# Patient Record
Sex: FEMALE | Race: BLACK OR AFRICAN AMERICAN | NOT HISPANIC OR LATINO | Employment: FULL TIME | URBAN - METROPOLITAN AREA
[De-identification: names, ages, dates, MRNs, and addresses within clinical notes are randomized per-mention and may not be internally consistent; named-entity substitution may affect disease eponyms.]

---

## 2020-08-31 ENCOUNTER — HOSPITAL ENCOUNTER (EMERGENCY)
Facility: HOSPITAL | Age: 35
Discharge: HOME | End: 2020-08-31
Attending: EMERGENCY MEDICINE | Admitting: EMERGENCY MEDICINE
Payer: COMMERCIAL

## 2020-08-31 VITALS
WEIGHT: 290 LBS | TEMPERATURE: 99 F | SYSTOLIC BLOOD PRESSURE: 137 MMHG | RESPIRATION RATE: 20 BRPM | DIASTOLIC BLOOD PRESSURE: 87 MMHG | OXYGEN SATURATION: 98 % | HEART RATE: 86 BPM

## 2020-08-31 DIAGNOSIS — K21.9 ACID REFLUX: ICD-10-CM

## 2020-08-31 DIAGNOSIS — R00.2 PALPITATIONS: Primary | ICD-10-CM

## 2020-08-31 LAB
ALBUMIN SERPL BCP-MCNC: 3.7 G/DL (ref 3.5–5)
ALP SERPL-CCNC: 49 U/L (ref 46–116)
ALT SERPL W P-5'-P-CCNC: 32 U/L (ref 12–78)
ANION GAP SERPL CALCULATED.3IONS-SCNC: 8 MMOL/L (ref 4–13)
AST SERPL W P-5'-P-CCNC: 15 U/L (ref 5–45)
BASOPHILS # BLD AUTO: 0.06 THOUSANDS/ΜL (ref 0–0.1)
BASOPHILS NFR BLD AUTO: 1 % (ref 0–1)
BILIRUB SERPL-MCNC: 0.1 MG/DL (ref 0.2–1)
BUN SERPL-MCNC: 9 MG/DL (ref 5–25)
CALCIUM SERPL-MCNC: 9.2 MG/DL (ref 8.3–10.1)
CHLORIDE SERPL-SCNC: 103 MMOL/L (ref 100–108)
CO2 SERPL-SCNC: 27 MMOL/L (ref 21–32)
CREAT SERPL-MCNC: 1.01 MG/DL (ref 0.6–1.3)
EOSINOPHIL # BLD AUTO: 0.18 THOUSAND/ΜL (ref 0–0.61)
EOSINOPHIL NFR BLD AUTO: 2 % (ref 0–6)
ERYTHROCYTE [DISTWIDTH] IN BLOOD BY AUTOMATED COUNT: 16.1 % (ref 11.6–15.1)
GFR SERPL CREATININE-BSD FRML MDRD: 85 ML/MIN/1.73SQ M
GLUCOSE SERPL-MCNC: 128 MG/DL (ref 65–140)
HCT VFR BLD AUTO: 34.1 % (ref 34.8–46.1)
HGB BLD-MCNC: 10.5 G/DL (ref 11.5–15.4)
IMM GRANULOCYTES # BLD AUTO: 0.02 THOUSAND/UL (ref 0–0.2)
IMM GRANULOCYTES NFR BLD AUTO: 0 % (ref 0–2)
LYMPHOCYTES # BLD AUTO: 3.12 THOUSANDS/ΜL (ref 0.6–4.47)
LYMPHOCYTES NFR BLD AUTO: 38 % (ref 14–44)
MCH RBC QN AUTO: 24.4 PG (ref 26.8–34.3)
MCHC RBC AUTO-ENTMCNC: 30.8 G/DL (ref 31.4–37.4)
MCV RBC AUTO: 79 FL (ref 82–98)
MONOCYTES # BLD AUTO: 0.57 THOUSAND/ΜL (ref 0.17–1.22)
MONOCYTES NFR BLD AUTO: 7 % (ref 4–12)
NEUTROPHILS # BLD AUTO: 4.36 THOUSANDS/ΜL (ref 1.85–7.62)
NEUTS SEG NFR BLD AUTO: 52 % (ref 43–75)
NRBC BLD AUTO-RTO: 0 /100 WBCS
PLATELET # BLD AUTO: 471 THOUSANDS/UL (ref 149–390)
PMV BLD AUTO: 9.8 FL (ref 8.9–12.7)
POTASSIUM SERPL-SCNC: 3.6 MMOL/L (ref 3.5–5.3)
PROT SERPL-MCNC: 7.6 G/DL (ref 6.4–8.2)
RBC # BLD AUTO: 4.31 MILLION/UL (ref 3.81–5.12)
SODIUM SERPL-SCNC: 138 MMOL/L (ref 136–145)
TROPONIN I SERPL-MCNC: <0.02 NG/ML
TSH SERPL DL<=0.05 MIU/L-ACNC: 2.88 UIU/ML (ref 0.36–3.74)
WBC # BLD AUTO: 8.31 THOUSAND/UL (ref 4.31–10.16)

## 2020-08-31 PROCEDURE — 99285 EMERGENCY DEPT VISIT HI MDM: CPT

## 2020-08-31 PROCEDURE — 93005 ELECTROCARDIOGRAM TRACING: CPT

## 2020-08-31 PROCEDURE — 84484 ASSAY OF TROPONIN QUANT: CPT | Performed by: PHYSICIAN ASSISTANT

## 2020-08-31 PROCEDURE — 85025 COMPLETE CBC W/AUTO DIFF WBC: CPT | Performed by: PHYSICIAN ASSISTANT

## 2020-08-31 PROCEDURE — 84443 ASSAY THYROID STIM HORMONE: CPT | Performed by: PHYSICIAN ASSISTANT

## 2020-08-31 PROCEDURE — 36415 COLL VENOUS BLD VENIPUNCTURE: CPT | Performed by: PHYSICIAN ASSISTANT

## 2020-08-31 PROCEDURE — 99285 EMERGENCY DEPT VISIT HI MDM: CPT | Performed by: PHYSICIAN ASSISTANT

## 2020-08-31 PROCEDURE — 80053 COMPREHEN METABOLIC PANEL: CPT | Performed by: PHYSICIAN ASSISTANT

## 2020-08-31 RX ORDER — SUCRALFATE ORAL 1 G/10ML
1000 SUSPENSION ORAL ONCE
Status: COMPLETED | OUTPATIENT
Start: 2020-08-31 | End: 2020-08-31

## 2020-08-31 RX ORDER — LIDOCAINE HYDROCHLORIDE 20 MG/ML
15 SOLUTION OROPHARYNGEAL ONCE
Status: DISCONTINUED | OUTPATIENT
Start: 2020-08-31 | End: 2020-09-01 | Stop reason: HOSPADM

## 2020-08-31 RX ORDER — SUCRALFATE ORAL 1 G/10ML
1 SUSPENSION ORAL 4 TIMES DAILY
Qty: 420 ML | Refills: 0 | Status: SHIPPED | OUTPATIENT
Start: 2020-08-31 | End: 2020-09-24

## 2020-08-31 RX ADMIN — SUCRALFATE 1000 MG: 1 SUSPENSION ORAL at 21:33

## 2020-09-01 LAB
ATRIAL RATE: 94 BPM
P AXIS: 41 DEGREES
PR INTERVAL: 144 MS
QRS AXIS: 37 DEGREES
QRSD INTERVAL: 82 MS
QT INTERVAL: 344 MS
QTC INTERVAL: 430 MS
T WAVE AXIS: 12 DEGREES
VENTRICULAR RATE: 94 BPM

## 2020-09-01 PROCEDURE — 93010 ELECTROCARDIOGRAM REPORT: CPT | Performed by: INTERNAL MEDICINE

## 2020-09-01 NOTE — ED PROVIDER NOTES
History  Chief Complaint   Patient presents with    Palpitations     Pt states she has been having a fluttering feeling in her chest on and off since about 1:30 this morning      Patient is a 53-year-old female presents emergency department with complaints of heart palpitations and chest burning  Patient states symptoms began this morning and then resolved  States that the symptoms then returned this evening  States that she is not uncomfortable sensation in her chest that will cause burning up into her throat  She denies any nausea, vomiting, fever, chills, shortness of breath, cough, and congestion  She states that she not taking medication for this  None       Past Medical History:   Diagnosis Date    Disease of thyroid gland     Fibroids        Past Surgical History:   Procedure Laterality Date    THYROIDECTOMY         History reviewed  No pertinent family history  I have reviewed and agree with the history as documented  E-Cigarette/Vaping     E-Cigarette/Vaping Substances     Social History     Tobacco Use    Smoking status: Current Every Day Smoker     Packs/day: 0 25    Smokeless tobacco: Never Used   Substance Use Topics    Alcohol use: Yes     Comment: socially     Drug use: Never       Review of Systems   Constitutional: Negative for fever  Eyes: Negative for visual disturbance  Respiratory: Negative for shortness of breath  Cardiovascular: Positive for palpitations  Negative for chest pain  Gastrointestinal: Negative for diarrhea, nausea and vomiting  Reflux   All other systems reviewed and are negative  Physical Exam  Physical Exam  Vitals signs and nursing note reviewed  Constitutional:       Appearance: Normal appearance  HENT:      Head: Normocephalic and atraumatic        Right Ear: Tympanic membrane, ear canal and external ear normal       Left Ear: Tympanic membrane, ear canal and external ear normal       Nose: Nose normal       Mouth/Throat: Mouth: Mucous membranes are moist       Pharynx: Oropharynx is clear  Eyes:      Extraocular Movements: Extraocular movements intact  Conjunctiva/sclera: Conjunctivae normal       Pupils: Pupils are equal, round, and reactive to light  Neck:      Musculoskeletal: Normal range of motion  Cardiovascular:      Rate and Rhythm: Normal rate and regular rhythm  Pulses: Normal pulses  Heart sounds: Normal heart sounds  Pulmonary:      Effort: Pulmonary effort is normal       Breath sounds: Normal breath sounds  Abdominal:      General: Abdomen is flat  There is no distension  Palpations: Abdomen is soft  Musculoskeletal: Normal range of motion  Skin:     General: Skin is warm  Capillary Refill: Capillary refill takes less than 2 seconds  Neurological:      General: No focal deficit present  Mental Status: She is alert and oriented to person, place, and time  Psychiatric:         Mood and Affect: Mood normal          Behavior: Behavior normal          Thought Content:  Thought content normal          Judgment: Judgment normal          Vital Signs  ED Triage Vitals [08/31/20 1910]   Temperature Pulse Respirations Blood Pressure SpO2   99 °F (37 2 °C) 91 22 122/68 100 %      Temp Source Heart Rate Source Patient Position - Orthostatic VS BP Location FiO2 (%)   Oral Monitor Sitting Left arm --      Pain Score       --           Vitals:    08/31/20 1910 08/31/20 2030 08/31/20 2039   BP: 122/68 137/87    Pulse: 91 96 86   Patient Position - Orthostatic VS: Sitting Lying          Visual Acuity      ED Medications  Medications   sucralfate (CARAFATE) oral suspension 1,000 mg (1,000 mg Oral Given 8/31/20 2133)       Diagnostic Studies  Results Reviewed     Procedure Component Value Units Date/Time    TSH [[id number]]  (Normal) Collected:  08/31/20 2014    Lab Status:  Final result Specimen:  Blood from Arm, Right Updated:  08/31/20 2045     TSH 3RD [facility] 2 876 uIU/mL     Narrative: Patients undergoing fluorescein dye angiography may retain small amounts of fluorescein in the body for 48-72 hours post procedure  Samples containing fluorescein can produce falsely depressed TSH values  If the patient had this procedure,a specimen should be resubmitted post fluorescein clearance        Troponin I [[id number]]  (Normal) Collected:  08/31/20 2014    Lab Status:  Final result Specimen:  Blood from Arm, Right Updated:  08/31/20 2038     Troponin I <0 02 ng/mL     Comprehensive metabolic panel [[id number]]  (Abnormal) Collected:  08/31/20 2014    Lab Status:  Final result Specimen:  Blood from Arm, Right Updated:  08/31/20 2036     Sodium 138 mmol/L      Potassium 3 6 mmol/L      Chloride 103 mmol/L      CO2 27 mmol/L      ANION GAP 8 mmol/L      BUN 9 mg/dL      Creatinine 1 01 mg/dL      Glucose 128 mg/dL      Calcium 9 2 mg/dL      AST 15 U/L      ALT 32 U/L      Alkaline Phosphatase 49 U/L      Total Protein 7 6 g/dL      Albumin 3 7 g/dL      Total Bilirubin 0 10 mg/dL      eGFR 85 ml/min/1 73sq m     Narrative:       [place] guidelines for Chronic Kidney Disease (CKD):     Stage 1 with normal or high GFR (GFR > 90 mL/min/1 73 square meters)    Stage 2 Mild CKD (GFR = 60-89 mL/min/1 73 square meters)    Stage 3A Moderate CKD (GFR = 45-59 mL/min/1 73 square meters)    Stage 3B Moderate CKD (GFR = 30-44 mL/min/1 73 square meters)    Stage 4 Severe CKD (GFR = 15-29 mL/min/1 73 square meters)    Stage 5 End Stage CKD (GFR <15 mL/min/1 73 square meters)  Note: GFR calculation is accurate only with a steady state creatinine    CBC and differential [[id number]]  (Abnormal) Collected:  08/31/20 2014    Lab Status:  Final result Specimen:  Blood from Arm, Right Updated:  08/31/20 2020     WBC 8 31 Thousand/uL      RBC 4 31 Million/uL      Hemoglobin 10 5 g/dL      Hematocrit 34 1 %      MCV 79 fL      MCH 24 4 pg      MCHC 30 8 g/dL      RDW 16 1 %      MPV 9 8 fL Platelets 953 Thousands/uL      nRBC 0 /100 WBCs      Neutrophils Relative 52 %      Immat GRANS % 0 %      Lymphocytes Relative 38 %      Monocytes Relative 7 %      Eosinophils Relative 2 %      Basophils Relative 1 %      Neutrophils Absolute 4 36 Thousands/µL      Immature Grans Absolute 0 02 Thousand/uL      Lymphocytes Absolute 3 12 Thousands/µL      Monocytes Absolute 0 57 Thousand/µL      Eosinophils Absolute 0 18 Thousand/µL      Basophils Absolute 0 06 Thousands/µL                  No orders to display              Procedures  ECG 12 Lead Documentation Only    Date/Time: 9/1/2020 3:59 AM  Performed by: Flori John PA-C  Authorized by: Flori John PA-C     Indications / Diagnosis:  Palpitations  ECG reviewed by me, the ED Provider: yes    Patient location:  ED  Previous ECG:     Previous ECG:  Unavailable  Interpretation:     Interpretation: normal    Rate:     ECG rate:  84    ECG rate assessment: normal    Rhythm:     Rhythm: sinus rhythm               ED Course       US AUDIT      Most Recent Value   Initial Alcohol Screen: US AUDIT-C    1  How often do you have a drink containing alcohol?  0 Filed at: 08/31/2020 2018   2  How many drinks containing alcohol do you have on a typical day you are drinking? 0 Filed at: 08/31/2020 2018   3a  Male UNDER 65: How often do you have five or more drinks on one occasion? 0 Filed at: 08/31/2020 2018   3b  FEMALE Any Age, or MALE 65+: How often do you have 4 or more drinks on one occassion? 0 Filed at: 08/31/2020 2018   Audit-C Score  0 Filed at: 08/31/2020 2018                  MARCOS/DAST-10      Most Recent Value   How many times in the past year have you    Used an illegal drug or used a prescription medication for non-medical reasons?   Never Filed at: 08/31/2020 2018                                MDM  Number of Diagnoses or Management Options  Acid reflux:   Palpitations:   Diagnosis management comments: Patient is a 59-year-old female presents emergency department with complaints of heart palpitations and chest burning  Patient states symptoms began this morning and then resolved  States that the symptoms then returned this evening  States that she is not uncomfortable sensation in her chest that will cause burning up into her throat  She denies any nausea, vomiting, fever, chills, shortness of breath, cough, and congestion  She states that she not taking medication for this  On examination, exam is unremarkable  Lab evaluations performed and is unremarkable  There is no evidence of ACS  Patient was given Carafate solution had improvement of symptoms  Discussed with patient her symptoms more consistent with reflux  Prescription for Carafate was given  She is to follow up with GI  Return parameters were discussed  Patient stable for discharge  Amount and/or Complexity of Data Reviewed  Clinical lab tests: ordered and reviewed  Tests in the radiology section of CPT®: ordered and reviewed  Independent visualization of images, tracings, or specimens: yes    Risk of Complications, Morbidity, and/or Mortality  Presenting problems: moderate  Diagnostic procedures: low  Management options: moderate    Patient Progress  Patient progress: stable        Disposition  Final diagnoses:   Palpitations   Acid reflux     Time reflects when diagnosis was documented in both MDM as applicable and the Disposition within this note     Time User Action Codes Description Comment    8/31/2020  9:54 PM Tia Block Add [R00 2] Palpitations     8/31/2020  9:54 PM Tia Block Add [K21 9] Acid reflux       ED Disposition     ED Disposition Condition Date/Time Comment    Discharge Good Mon Aug 31, 2020 2154 Amrit Galan discharge to home/self care              Follow-up Information     Follow up With Specialties Details Why Contact Info Additional Information    MILTON Lundberg Nurse Practitioner, Family Medicine Schedule an appointment as soon as possible for a visit   [address] [place] [address] [facility]   [facility] [place] [id number] [address] [facility] [place], [address], [facility] [place], [place], [address] [facility]          Discharge Medication List as of 8/31/2020  9:55 PM      START taking these medications    Details   sucralfate (CARAFATE) 1 g/10 mL suspension Take 10 mL (1 g total) by mouth 4 (four) times a day, Starting Mon 8/31/2020, Normal           No discharge procedures on file      PDMP Review     None          ED Provider  Electronically Signed by           Yane Patel PA-C  09/01/20 9314

## 2020-09-16 RX ORDER — LEVOTHYROXINE SODIUM 0.05 MG/1
TABLET ORAL
COMMUNITY
Start: 2020-08-26

## 2020-09-16 RX ORDER — NYSTATIN 100000 U/G
CREAM TOPICAL 2 TIMES DAILY
COMMUNITY
Start: 2020-05-29 | End: 2021-05-06 | Stop reason: ALTCHOICE

## 2020-09-16 RX ORDER — TRIAMCINOLONE ACETONIDE 1 MG/G
CREAM TOPICAL 2 TIMES DAILY
COMMUNITY
Start: 2020-07-07 | End: 2021-05-06 | Stop reason: ALTCHOICE

## 2020-09-16 RX ORDER — ERGOCALCIFEROL 1.25 MG/1
50000 CAPSULE ORAL
COMMUNITY
Start: 2020-09-01 | End: 2020-11-24

## 2020-09-16 RX ORDER — FERROUS SULFATE 325(65) MG
325 TABLET ORAL
COMMUNITY
Start: 2020-09-01 | End: 2021-07-12 | Stop reason: SDUPTHER

## 2020-09-17 ENCOUNTER — OFFICE VISIT (OUTPATIENT)
Dept: GASTROENTEROLOGY | Facility: CLINIC | Age: 35
End: 2020-09-17
Payer: COMMERCIAL

## 2020-09-17 VITALS
SYSTOLIC BLOOD PRESSURE: 102 MMHG | BODY MASS INDEX: 47.6 KG/M2 | DIASTOLIC BLOOD PRESSURE: 68 MMHG | TEMPERATURE: 98.3 F | HEIGHT: 64 IN | WEIGHT: 278.8 LBS | HEART RATE: 88 BPM

## 2020-09-17 DIAGNOSIS — K21.9 GASTROESOPHAGEAL REFLUX DISEASE, ESOPHAGITIS PRESENCE NOT SPECIFIED: Primary | ICD-10-CM

## 2020-09-17 DIAGNOSIS — K76.9 LIVER LESION: ICD-10-CM

## 2020-09-17 PROCEDURE — 99203 OFFICE O/P NEW LOW 30 MIN: CPT | Performed by: PHYSICIAN ASSISTANT

## 2020-09-17 RX ORDER — PANTOPRAZOLE SODIUM 40 MG/1
40 TABLET, DELAYED RELEASE ORAL DAILY
Qty: 60 TABLET | Refills: 3 | Status: SHIPPED | OUTPATIENT
Start: 2020-09-17 | End: 2021-05-06 | Stop reason: ALTCHOICE

## 2020-09-17 NOTE — PROGRESS NOTES
[facility] - Outpatient Consultation  Mao Doe 37 y o  female MRN: [id number]  Encounter: [id number]          ASSESSMENT AND PLAN:      1  Gastroesophageal reflux disease, esophagitis presence not specified  Start Pantoprazole 40mg BID  Schedule EGD    2  Liver lesion  Abn US showing 2 small lesions - MRI recommended which she could not tolerate - will plan CT     ______________________________________________________________________    HPI:  81-year-old female presents for evaluation of heartburn, regurgitation, dysphagia, chest pain and abnormal ultrasound  She has been having heartburn issues for many years but reports that recently she has been having increasing symptoms  She states that the symptoms were so severe in August 31st that she went to the emergency room  Workup was relatively unremarkable  She was discharged with a prescription for sucralfate which he has been taking 4 times a day  She admits that her symptoms had improved but are still present  She has drastically changed where she is not eating most acidic foods and has cut back on fried fatty foods  Prior to being evaluated in the emergency room she had been taking large quantities of ibuprofen due to symptomatic fibroids  Since the emergency room she has stopped this  She denies any excessive alcohol use  She has never seen a gastroenterologist or had an endoscopy for her heartburn in the past   She denies any hematemesis or melena  She had an ultrasound of the abdomen in July  This showed 2 hyperechoic lesions measuring 2 9 in 2 7 cm in the right and left hepatic lobes  MRI was recommended which she was scheduled  Unfortunately, she could not tolerate the test she became very claustrophobic  She has never been on estrogen containing oral contraceptive pills  REVIEW OF SYSTEMS:    CONSTITUTIONAL: Denies any fever, chills, rigors, and weight loss  HEENT: No earache or tinnitus   Denies hearing loss or visual disturbances  CARDIOVASCULAR: No chest pain or palpitations  RESPIRATORY: Denies any cough, hemoptysis, shortness of breath or dyspnea on exertion  GASTROINTESTINAL: As noted in the History of Present Illness  GENITOURINARY: No problems with urination  Denies any hematuria or dysuria  NEUROLOGIC: No dizziness or vertigo, denies headaches  MUSCULOSKELETAL: Denies any muscle or joint pain  SKIN: Denies skin rashes or itching  ENDOCRINE: Denies excessive thirst  Denies intolerance to heat or cold  PSYCHOSOCIAL: Denies depression or anxiety  Denies any recent memory loss  Historical Information   Past Medical History:   Diagnosis Date    Disease of thyroid gland     Fibroids      Past Surgical History:   Procedure Laterality Date    THYROIDECTOMY       Social History   Social History     Substance and Sexual Activity   Alcohol Use Yes    Comment: socially      Social History     Substance and Sexual Activity   Drug Use Never     Social History     Tobacco Use   Smoking Status Former Smoker    Packs/day: 0 25   Smokeless Tobacco Never Used   Tobacco Comment    just quit      History reviewed  No pertinent family history  Meds/Allergies       Current Outpatient Medications:     ergocalciferol (VITAMIN D2) 50,000 units    ferrous sulfate 325 (65 Fe) mg tablet    levothyroxine 50 mcg tablet    nystatin (MYCOSTATIN) cream    sucralfate (CARAFATE) 1 g/10 mL suspension    triamcinolone (KENALOG) 0 1 % cream    pantoprazole (PROTONIX) 40 mg tablet    No Known Allergies        Objective     Blood pressure 102/68, pulse 88, temperature 98 3 °F (36 8 °C), height 5' 4" (1 626 m), weight 126 kg (278 lb 12 8 oz)  Body mass index is 47 86 kg/m²          PHYSICAL EXAM:      General Appearance:   Alert, cooperative, no distress   HEENT:   Normocephalic, atraumatic, anicteric      Neck:  Supple, symmetrical, trachea midline   Lungs:   Clear to auscultation bilaterally; no rales, rhonchi or wheezing; respirations unlabored    Heart[de-identified]   Regular rate and rhythm; no murmur, rub, or gallop  Abdomen:   Soft, non-tender, non-distended; normal bowel sounds; no masses, no organomegaly    Genitalia:   Deferred    Rectal:   Deferred    Extremities:  No cyanosis, clubbing or edema    Pulses:  2+ and symmetric    Skin:  No jaundice, rashes, or lesions    Lymph nodes:  No palpable cervical lymphadenopathy        Lab Results:   No visits with results within 1 Day(s) from this visit     Latest known visit with results is:   Admission on 08/31/2020, Discharged on 08/31/2020   Component Date Value    WBC 08/31/2020 8 31     RBC 08/31/2020 4 31     Hemoglobin 08/31/2020 10 5*    Hematocrit 08/31/2020 34 1*    MCV 08/31/2020 79*    MCH 08/31/2020 24 4*    MCHC 08/31/2020 30 8*    RDW 08/31/2020 16 1*    MPV 08/31/2020 9 8     Platelets 77/66/3525 471*    nRBC 08/31/2020 0     Neutrophils Relative 08/31/2020 52     Immat GRANS % 08/31/2020 0     Lymphocytes Relative 08/31/2020 38     Monocytes Relative 08/31/2020 7     Eosinophils Relative 08/31/2020 2     Basophils Relative 08/31/2020 1     Neutrophils Absolute 08/31/2020 4 36     Immature Grans Absolute 08/31/2020 0 02     Lymphocytes Absolute 08/31/2020 3 12     Monocytes Absolute 08/31/2020 0 57     Eosinophils Absolute 08/31/2020 0 18     Basophils Absolute 08/31/2020 0 06     Sodium 08/31/2020 138     Potassium 08/31/2020 3 6     Chloride 08/31/2020 103     CO2 08/31/2020 27     ANION GAP 08/31/2020 8     BUN 08/31/2020 9     Creatinine 08/31/2020 1 01     Glucose 08/31/2020 128     Calcium 08/31/2020 9 2     AST 08/31/2020 15     ALT 08/31/2020 32     Alkaline Phosphatase 08/31/2020 49     Total Protein 08/31/2020 7 6     Albumin 08/31/2020 3 7     Total Bilirubin 08/31/2020 0 10*    eGFR 08/31/2020 85     Troponin I 08/31/2020 <0 02     TSH 3RD GENERATON 08/31/2020 2 876     Ventricular Rate 08/31/2020 94     Atrial Rate 08/31/2020 94     AK Interval 08/31/2020 144     QRSD Interval 08/31/2020 82     QT Interval 08/31/2020 344     QTC Interval 08/31/2020 430     P Axis 08/31/2020 41     QRS Axis 08/31/2020 37     T Wave [place] 08/31/2020 12          Radiology Results:   No results found

## 2020-09-24 ENCOUNTER — ANESTHESIA (OUTPATIENT)
Dept: GASTROENTEROLOGY | Facility: HOSPITAL | Age: 35
End: 2020-09-24

## 2020-09-24 ENCOUNTER — HOSPITAL ENCOUNTER (OUTPATIENT)
Dept: GASTROENTEROLOGY | Facility: HOSPITAL | Age: 35
Setting detail: OUTPATIENT SURGERY
Discharge: HOME | End: 2020-09-24
Attending: INTERNAL MEDICINE | Admitting: INTERNAL MEDICINE
Payer: COMMERCIAL

## 2020-09-24 ENCOUNTER — ANESTHESIA EVENT (OUTPATIENT)
Dept: GASTROENTEROLOGY | Facility: HOSPITAL | Age: 35
End: 2020-09-24

## 2020-09-24 VITALS
WEIGHT: 276.9 LBS | HEIGHT: 64 IN | SYSTOLIC BLOOD PRESSURE: 127 MMHG | RESPIRATION RATE: 17 BRPM | DIASTOLIC BLOOD PRESSURE: 72 MMHG | OXYGEN SATURATION: 99 % | HEART RATE: 84 BPM | BODY MASS INDEX: 47.27 KG/M2 | TEMPERATURE: 97.7 F

## 2020-09-24 VITALS — HEART RATE: 101 BPM

## 2020-09-24 DIAGNOSIS — K21.9 GASTROESOPHAGEAL REFLUX DISEASE, ESOPHAGITIS PRESENCE NOT SPECIFIED: ICD-10-CM

## 2020-09-24 PROBLEM — E03.9 HYPOTHYROIDISM: Status: ACTIVE | Noted: 2020-09-24

## 2020-09-24 LAB
EXT PREGNANCY TEST URINE: NEGATIVE
EXT. CONTROL: NORMAL

## 2020-09-24 PROCEDURE — 88342 IMHCHEM/IMCYTCHM 1ST ANTB: CPT | Performed by: PATHOLOGY

## 2020-09-24 PROCEDURE — 88341 IMHCHEM/IMCYTCHM EA ADD ANTB: CPT | Performed by: PATHOLOGY

## 2020-09-24 PROCEDURE — 43239 EGD BIOPSY SINGLE/MULTIPLE: CPT | Performed by: INTERNAL MEDICINE

## 2020-09-24 PROCEDURE — 88305 TISSUE EXAM BY PATHOLOGIST: CPT | Performed by: PATHOLOGY

## 2020-09-24 PROCEDURE — 81025 URINE PREGNANCY TEST: CPT | Performed by: STUDENT IN AN ORGANIZED HEALTH CARE EDUCATION/TRAINING PROGRAM

## 2020-09-24 RX ORDER — PROPOFOL 10 MG/ML
INJECTION, EMULSION INTRAVENOUS AS NEEDED
Status: DISCONTINUED | OUTPATIENT
Start: 2020-09-24 | End: 2020-09-24

## 2020-09-24 RX ORDER — SODIUM CHLORIDE, SODIUM LACTATE, POTASSIUM CHLORIDE, CALCIUM CHLORIDE 600; 310; 30; 20 MG/100ML; MG/100ML; MG/100ML; MG/100ML
100 INJECTION, SOLUTION INTRAVENOUS CONTINUOUS
Status: DISCONTINUED | OUTPATIENT
Start: 2020-09-24 | End: 2020-09-28 | Stop reason: HOSPADM

## 2020-09-24 RX ORDER — LIDOCAINE HYDROCHLORIDE 10 MG/ML
INJECTION, SOLUTION EPIDURAL; INFILTRATION; INTRACAUDAL; PERINEURAL AS NEEDED
Status: DISCONTINUED | OUTPATIENT
Start: 2020-09-24 | End: 2020-09-24

## 2020-09-24 RX ADMIN — PROPOFOL 50 MG: 10 INJECTION, EMULSION INTRAVENOUS at 11:43

## 2020-09-24 RX ADMIN — SODIUM CHLORIDE, SODIUM LACTATE, POTASSIUM CHLORIDE, AND CALCIUM CHLORIDE 100 ML/HR: .6; .31; .03; .02 INJECTION, SOLUTION INTRAVENOUS at 10:47

## 2020-09-24 RX ADMIN — LIDOCAINE HYDROCHLORIDE 50 MG: 10 INJECTION, SOLUTION EPIDURAL; INFILTRATION; INTRACAUDAL; PERINEURAL at 11:39

## 2020-09-24 RX ADMIN — PROPOFOL 100 MG: 10 INJECTION, EMULSION INTRAVENOUS at 11:39

## 2020-09-24 RX ADMIN — PROPOFOL 50 MG: 10 INJECTION, EMULSION INTRAVENOUS at 11:41

## 2020-09-24 NOTE — H&P
History and Physical - [facility]  Brian Camarillo 37 y o  female MRN: [id number]      HPI: Brian Camarillo is a 37y o  year old female who presents for evaluation of gastroesophageal reflux disease      REVIEW OF SYSTEMS: Per the HPI, and otherwise unremarkable  Historical Information   Past Medical History:   Diagnosis Date    Anemia     Disease of thyroid gland     Fibroids      Past Surgical History:   Procedure Laterality Date    THYROIDECTOMY       Social History   Social History     Substance and Sexual Activity   Alcohol Use Not Currently    Comment: socially      Social History     Substance and Sexual Activity   Drug Use Never     Social History     Tobacco Use   Smoking Status Former Smoker    Packs/day: 0 25   Smokeless Tobacco Never Used   Tobacco Comment    just quit      History reviewed  No pertinent family history  Meds/Allergies     (Not in a hospital admission)      No Known Allergies    Objective     Blood pressure 137/72, pulse 89, temperature 97 5 °F (36 4 °C), temperature source Temporal, resp  rate 20, height 5' 4" (1 626 m), weight 126 kg (276 lb 14 4 oz), last menstrual period 09/16/2020, SpO2 100 %  PHYSICAL EXAM    Gen: NAD  CV: RRR  CHEST: Clear  ABD: soft, NT/ND  EXT: no edema      ASSESSMENT/PLAN:  This is a 37y o  year old female here for esophagogastroduodenoscopy, and she is stable and optimized for her procedure

## 2020-09-24 NOTE — DISCHARGE INSTRUCTIONS
Upper Endoscopy   WHAT YOU NEED TO KNOW:   An upper endoscopy is also called an upper gastrointestinal (GI) endoscopy, or an esophagogastroduodenoscopy (EGD)  You may feel bloated, gassy, or have some abdominal discomfort after your procedure  Your throat may be sore for 24 to 36 hours  You may burp or pass gas from air that is still inside your body  DISCHARGE INSTRUCTIONS:   Call 911 for any of the following:   · You have sudden chest pain or trouble breathing  Seek care immediately if:   · You feel dizzy or faint  · You have trouble swallowing  · Your bowel movements are very dark or black  · Your abdomen is hard and firm and you have severe pain  · You vomit blood  Contact your healthcare provider if:   · You feel full or bloated and cannot burp or pass gas  · You have not had a bowel movement for 3 days after your procedure  · You have neck pain  · You have a fever or chills  · You have nausea or are vomiting  · You have a rash or hives  · You have questions or concerns about your endoscopy  Relieve a sore throat:  Suck on throat lozenges or crushed ice  Gargle with a small amount of warm salt water  Mix 1 teaspoon of salt and 1 cup of warm water to make salt water  Relieve gas and discomfort from bloating:  Lie on your right side with a heating pad on your abdomen  Take short walks to help pass gas  Eat small meals until bloating is relieved  Rest after your procedure: You have been given medicine to relax you  Do not  drive or make important decisions until the day after your procedure  Return to your normal activity as directed  You can usually return to work the day after your procedure  Follow up with your healthcare provider as directed:  Write down your questions so you remember to ask them during your visits     © 2017 [address] is for End User's use only and may not be sold, redistributed or otherwise used for commercial purposes  All illustrations and images included in CareNotes® are the copyrighted property of A D A M , Inc  or Kishor Cobb  The above information is an  only  It is not intended as medical advice for individual conditions or treatments  Talk to your doctor, nurse or pharmacist before following any medical regimen to see if it is safe and effective for you

## 2020-09-24 NOTE — ANESTHESIA PREPROCEDURE EVALUATION
Procedure:  EGD      Relevant Problems   ANESTHESIA (within normal limits)      ENDO   (+) Hypothyroidism      GI/HEPATIC   (+) Acid reflux        Physical Exam    Airway    Mallampati score: II  TM Distance: >3 FB  Neck ROM: full     Dental   No notable dental hx     Cardiovascular      Pulmonary      Other Findings        Anesthesia Plan  ASA Score- 2     Anesthesia Type- IV sedation with anesthesia with ASA Monitors  Additional Monitors:   Airway Plan:     Comment: Per patient, appropriately NPO, denies active CP/SOB/wheezing/symptoms related to heartburn/nausea/vomiting  Plan Factors-Exercise tolerance (METS): >4 METS  Chart reviewed  Patient summary reviewed  Patient is not a current smoker  Induction- intravenous  Postoperative Plan-     Informed Consent- Anesthetic plan and risks discussed with patient  I personally reviewed this patient with the CRNA  Discussed and agreed on the Anesthesia Plan with the CRNA  Edwin Camilo

## 2020-09-24 NOTE — ANESTHESIA POSTPROCEDURE EVALUATION
Post-Op Assessment Note    CV Status:  Stable    Pain management: adequate     Mental Status:  Alert and awake   Hydration Status:  Euvolemic   PONV Controlled:  Controlled   Airway Patency:  Patent      Post Op Vitals Reviewed: Yes      Staff: CRNA         No complications documented      BP   130/72   Temp      Pulse  95   Resp 18   SpO2   95

## 2020-09-30 ENCOUNTER — TREATMENT (OUTPATIENT)
Dept: GASTROENTEROLOGY | Facility: CLINIC | Age: 35
End: 2020-09-30

## 2020-09-30 ENCOUNTER — TELEPHONE (OUTPATIENT)
Dept: GASTROENTEROLOGY | Facility: CLINIC | Age: 35
End: 2020-09-30

## 2020-09-30 DIAGNOSIS — B96.81 HELICOBACTER POSITIVE GASTRITIS: Primary | ICD-10-CM

## 2020-09-30 DIAGNOSIS — K29.70 HELICOBACTER POSITIVE GASTRITIS: Primary | ICD-10-CM

## 2020-09-30 RX ORDER — CLARITHROMYCIN 500 MG/1
500 TABLET, COATED ORAL EVERY 12 HOURS SCHEDULED
Qty: 28 TABLET | Refills: 0 | Status: SHIPPED | OUTPATIENT
Start: 2020-09-30 | End: 2020-10-14

## 2020-09-30 RX ORDER — PANTOPRAZOLE SODIUM 40 MG/1
40 TABLET, DELAYED RELEASE ORAL 2 TIMES DAILY
Qty: 28 TABLET | Refills: 0 | Status: SHIPPED | OUTPATIENT
Start: 2020-09-30 | End: 2020-11-17

## 2020-09-30 RX ORDER — AMOXICILLIN 500 MG/1
1000 CAPSULE ORAL EVERY 12 HOURS SCHEDULED
Qty: 56 CAPSULE | Refills: 0 | Status: SHIPPED | OUTPATIENT
Start: 2020-09-30 | End: 2020-10-14

## 2020-09-30 NOTE — TELEPHONE ENCOUNTER
Called and advised pt      Routing to clerical for appt in [facility] December  Thank you

## 2020-09-30 NOTE — TELEPHONE ENCOUNTER
----- Message from Brandon Patel DO sent at 9/30/2020  7:27 AM EDT -----  Please call the patient with these results  The biopsies of the stomach showed some Helicobacter pylori organisms  It also showed evidence of mild gastritis  Will need to treat this patient with triple drug therapy  The patient has no known allergies  I will be using clarithromycin 500 mg twice daily plus amoxicillin 1000 mg twice daily plus pantoprazole 40 mg twice daily  All 3 medications need to be taken for 2 weeks  I will call these medications into the pharmacy  The patient should be seen for follow-up 8 weeks after treatment  Please provide the patient with a follow-up with 1 my physician assistants in mid December

## 2020-10-16 ENCOUNTER — HOSPITAL ENCOUNTER (OUTPATIENT)
Dept: CT IMAGING | Facility: HOSPITAL | Age: 35
Discharge: HOME | End: 2020-10-16
Payer: COMMERCIAL

## 2020-10-16 DIAGNOSIS — K76.9 LIVER LESION: ICD-10-CM

## 2020-10-16 PROCEDURE — 74160 CT ABDOMEN W/CONTRAST: CPT

## 2020-10-16 PROCEDURE — G1004 CDSM NDSC: HCPCS

## 2020-10-16 RX ADMIN — IOHEXOL 100 ML: 350 INJECTION, SOLUTION INTRAVENOUS at 10:44

## 2020-10-21 ENCOUNTER — TELEPHONE (OUTPATIENT)
Dept: GASTROENTEROLOGY | Facility: CLINIC | Age: 35
End: 2020-10-21

## 2020-11-17 DIAGNOSIS — K29.70 HELICOBACTER POSITIVE GASTRITIS: ICD-10-CM

## 2020-11-17 DIAGNOSIS — B96.81 HELICOBACTER POSITIVE GASTRITIS: ICD-10-CM

## 2020-11-17 RX ORDER — PANTOPRAZOLE SODIUM 40 MG/1
40 TABLET, DELAYED RELEASE ORAL 2 TIMES DAILY
Qty: 28 TABLET | Refills: 0 | Status: SHIPPED | OUTPATIENT
Start: 2020-11-17 | End: 2020-12-01

## 2020-12-10 RX ORDER — OXYCODONE HYDROCHLORIDE AND ACETAMINOPHEN 5; 325 MG/1; MG/1
TABLET ORAL
COMMUNITY
Start: 2020-10-26

## 2020-12-10 RX ORDER — IBUPROFEN 400 MG/1
TABLET ORAL
COMMUNITY
Start: 2020-10-26 | End: 2021-06-01

## 2020-12-10 RX ORDER — NORETHINDRONE ACETATE AND ETHINYL ESTRADIOL AND FERROUS FUMARATE 1MG-20(21)
KIT ORAL
COMMUNITY
Start: 2020-11-10 | End: 2021-11-25

## 2020-12-15 ENCOUNTER — OFFICE VISIT (OUTPATIENT)
Dept: GASTROENTEROLOGY | Facility: CLINIC | Age: 35
End: 2020-12-15
Payer: COMMERCIAL

## 2020-12-15 VITALS
SYSTOLIC BLOOD PRESSURE: 126 MMHG | DIASTOLIC BLOOD PRESSURE: 74 MMHG | WEIGHT: 270.8 LBS | HEART RATE: 85 BPM | BODY MASS INDEX: 46.23 KG/M2 | HEIGHT: 64 IN

## 2020-12-15 DIAGNOSIS — B96.81 HELICOBACTER POSITIVE GASTRITIS: ICD-10-CM

## 2020-12-15 DIAGNOSIS — K29.70 HELICOBACTER POSITIVE GASTRITIS: ICD-10-CM

## 2020-12-15 DIAGNOSIS — K76.9 LIVER LESION: Primary | ICD-10-CM

## 2020-12-15 PROCEDURE — 99213 OFFICE O/P EST LOW 20 MIN: CPT | Performed by: PHYSICIAN ASSISTANT

## 2021-03-23 ENCOUNTER — HOSPITAL ENCOUNTER (OUTPATIENT)
Dept: CT IMAGING | Facility: HOSPITAL | Age: 36
Discharge: HOME | End: 2021-03-23
Payer: COMMERCIAL

## 2021-03-23 ENCOUNTER — TRANSCRIBE ORDERS (OUTPATIENT)
Dept: ADMINISTRATIVE | Facility: HOSPITAL | Age: 36
End: 2021-03-23

## 2021-03-23 ENCOUNTER — APPOINTMENT (OUTPATIENT)
Dept: LAB | Facility: HOSPITAL | Age: 36
End: 2021-03-23
Payer: COMMERCIAL

## 2021-03-23 DIAGNOSIS — E55.9 AVITAMINOSIS D: Primary | ICD-10-CM

## 2021-03-23 DIAGNOSIS — E55.9 AVITAMINOSIS D: ICD-10-CM

## 2021-03-23 DIAGNOSIS — D50.9 IRON DEFICIENCY ANEMIA, UNSPECIFIED IRON DEFICIENCY ANEMIA TYPE: ICD-10-CM

## 2021-03-23 DIAGNOSIS — K76.9 LIVER LESION: ICD-10-CM

## 2021-03-23 LAB
25(OH)D3 SERPL-MCNC: 21 NG/ML (ref 30–100)
AFP-TM SERPL-MCNC: 1.5 NG/ML (ref 0.5–8)
ALBUMIN SERPL BCP-MCNC: 3.3 G/DL (ref 3.5–5)
ALP SERPL-CCNC: 64 U/L (ref 46–116)
ALT SERPL W P-5'-P-CCNC: 25 U/L (ref 12–78)
AST SERPL W P-5'-P-CCNC: 14 U/L (ref 5–45)
BASOPHILS # BLD AUTO: 0.06 THOUSANDS/ΜL (ref 0–0.1)
BASOPHILS NFR BLD AUTO: 1 % (ref 0–1)
BILIRUB DIRECT SERPL-MCNC: 0.09 MG/DL (ref 0–0.2)
BILIRUB SERPL-MCNC: 0.3 MG/DL (ref 0.2–1)
EOSINOPHIL # BLD AUTO: 0.12 THOUSAND/ΜL (ref 0–0.61)
EOSINOPHIL NFR BLD AUTO: 2 % (ref 0–6)
ERYTHROCYTE [DISTWIDTH] IN BLOOD BY AUTOMATED COUNT: 16.3 % (ref 11.6–15.1)
FERRITIN SERPL-MCNC: 16 NG/ML (ref 8–388)
HBV SURFACE AG SER QL: NORMAL
HCT VFR BLD AUTO: 37.9 % (ref 34.8–46.1)
HCV AB SER QL: NORMAL
HGB BLD-MCNC: 11.4 G/DL (ref 11.5–15.4)
IMM GRANULOCYTES # BLD AUTO: 0.02 THOUSAND/UL (ref 0–0.2)
IMM GRANULOCYTES NFR BLD AUTO: 0 % (ref 0–2)
IRON SATN MFR SERPL: 21 %
IRON SERPL-MCNC: 88 UG/DL (ref 50–170)
LYMPHOCYTES # BLD AUTO: 3.15 THOUSANDS/ΜL (ref 0.6–4.47)
LYMPHOCYTES NFR BLD AUTO: 43 % (ref 14–44)
MCH RBC QN AUTO: 24.5 PG (ref 26.8–34.3)
MCHC RBC AUTO-ENTMCNC: 30.1 G/DL (ref 31.4–37.4)
MCV RBC AUTO: 81 FL (ref 82–98)
MONOCYTES # BLD AUTO: 0.45 THOUSAND/ΜL (ref 0.17–1.22)
MONOCYTES NFR BLD AUTO: 6 % (ref 4–12)
NEUTROPHILS # BLD AUTO: 3.62 THOUSANDS/ΜL (ref 1.85–7.62)
NEUTS SEG NFR BLD AUTO: 48 % (ref 43–75)
NRBC BLD AUTO-RTO: 0 /100 WBCS
PLATELET # BLD AUTO: 445 THOUSANDS/UL (ref 149–390)
PMV BLD AUTO: 10.3 FL (ref 8.9–12.7)
PROT SERPL-MCNC: 7.7 G/DL (ref 6.4–8.2)
RBC # BLD AUTO: 4.66 MILLION/UL (ref 3.81–5.12)
RETICS # AUTO: NORMAL 10*3/UL (ref 14097–95744)
RETICS # CALC: 1.24 % (ref 0.37–1.87)
TIBC SERPL-MCNC: 413 UG/DL (ref 250–450)
WBC # BLD AUTO: 7.42 THOUSAND/UL (ref 4.31–10.16)

## 2021-03-23 PROCEDURE — 85025 COMPLETE CBC W/AUTO DIFF WBC: CPT

## 2021-03-23 PROCEDURE — 82105 ALPHA-FETOPROTEIN SERUM: CPT

## 2021-03-23 PROCEDURE — 86803 HEPATITIS C AB TEST: CPT

## 2021-03-23 PROCEDURE — 80076 HEPATIC FUNCTION PANEL: CPT

## 2021-03-23 PROCEDURE — 36415 COLL VENOUS BLD VENIPUNCTURE: CPT

## 2021-03-23 PROCEDURE — G1004 CDSM NDSC: HCPCS

## 2021-03-23 PROCEDURE — 85045 AUTOMATED RETICULOCYTE COUNT: CPT

## 2021-03-23 PROCEDURE — 83550 IRON BINDING TEST: CPT

## 2021-03-23 PROCEDURE — 82728 ASSAY OF FERRITIN: CPT

## 2021-03-23 PROCEDURE — 82306 VITAMIN D 25 HYDROXY: CPT

## 2021-03-23 PROCEDURE — 74160 CT ABDOMEN W/CONTRAST: CPT

## 2021-03-23 PROCEDURE — 83540 ASSAY OF IRON: CPT

## 2021-03-23 PROCEDURE — 87340 HEPATITIS B SURFACE AG IA: CPT

## 2021-03-23 RX ADMIN — IOHEXOL 100 ML: 350 INJECTION, SOLUTION INTRAVENOUS at 08:45

## 2021-03-26 ENCOUNTER — APPOINTMENT (OUTPATIENT)
Dept: LAB | Facility: HOSPITAL | Age: 36
End: 2021-03-26
Payer: COMMERCIAL

## 2021-03-26 DIAGNOSIS — K76.9 LIVER LESION: ICD-10-CM

## 2021-03-26 PROCEDURE — 87338 HPYLORI STOOL AG IA: CPT

## 2021-03-28 LAB — H PYLORI AG STL QL IA: NEGATIVE

## 2021-03-29 ENCOUNTER — HOSPITAL ENCOUNTER (EMERGENCY)
Facility: HOSPITAL | Age: 36
Discharge: HOME | End: 2021-03-29
Attending: EMERGENCY MEDICINE | Admitting: EMERGENCY MEDICINE
Payer: COMMERCIAL

## 2021-03-29 ENCOUNTER — APPOINTMENT (EMERGENCY)
Dept: CT IMAGING | Facility: HOSPITAL | Age: 36
End: 2021-03-29
Payer: COMMERCIAL

## 2021-03-29 ENCOUNTER — TELEPHONE (OUTPATIENT)
Dept: GASTROENTEROLOGY | Facility: CLINIC | Age: 36
End: 2021-03-29

## 2021-03-29 VITALS
OXYGEN SATURATION: 99 % | RESPIRATION RATE: 16 BRPM | WEIGHT: 271.17 LBS | HEART RATE: 85 BPM | BODY MASS INDEX: 46.55 KG/M2 | TEMPERATURE: 98 F | SYSTOLIC BLOOD PRESSURE: 134 MMHG | DIASTOLIC BLOOD PRESSURE: 64 MMHG

## 2021-03-29 DIAGNOSIS — R10.31 RIGHT LOWER QUADRANT PAIN: Primary | ICD-10-CM

## 2021-03-29 DIAGNOSIS — N39.0 UTI (URINARY TRACT INFECTION): ICD-10-CM

## 2021-03-29 LAB
ALBUMIN SERPL BCP-MCNC: 3.5 G/DL (ref 3.5–5)
ALP SERPL-CCNC: 55 U/L (ref 46–116)
ALT SERPL W P-5'-P-CCNC: 21 U/L (ref 12–78)
ANION GAP SERPL CALCULATED.3IONS-SCNC: 11 MMOL/L (ref 4–13)
AST SERPL W P-5'-P-CCNC: 12 U/L (ref 5–45)
BACTERIA UR QL AUTO: ABNORMAL /HPF
BASOPHILS # BLD AUTO: 0.07 THOUSANDS/ΜL (ref 0–0.1)
BASOPHILS NFR BLD AUTO: 1 % (ref 0–1)
BILIRUB SERPL-MCNC: 0.11 MG/DL (ref 0.2–1)
BILIRUB UR QL STRIP: NEGATIVE
BUN SERPL-MCNC: 11 MG/DL (ref 5–25)
CALCIUM SERPL-MCNC: 8.2 MG/DL (ref 8.3–10.1)
CHLORIDE SERPL-SCNC: 104 MMOL/L (ref 100–108)
CLARITY UR: ABNORMAL
CO2 SERPL-SCNC: 24 MMOL/L (ref 21–32)
COLOR UR: YELLOW
CREAT SERPL-MCNC: 0.94 MG/DL (ref 0.6–1.3)
EOSINOPHIL # BLD AUTO: 0.19 THOUSAND/ΜL (ref 0–0.61)
EOSINOPHIL NFR BLD AUTO: 3 % (ref 0–6)
ERYTHROCYTE [DISTWIDTH] IN BLOOD BY AUTOMATED COUNT: 16.3 % (ref 11.6–15.1)
EXT PREG TEST URINE: NEGATIVE
EXT. CONTROL ED NAV: NORMAL
GFR SERPL CREATININE-BSD FRML MDRD: 92 ML/MIN/1.73SQ M
GLUCOSE SERPL-MCNC: 129 MG/DL (ref 65–140)
GLUCOSE UR STRIP-MCNC: NEGATIVE MG/DL
HCT VFR BLD AUTO: 40 % (ref 34.8–46.1)
HGB BLD-MCNC: 11.8 G/DL (ref 11.5–15.4)
HGB UR QL STRIP.AUTO: NEGATIVE
IMM GRANULOCYTES # BLD AUTO: 0.01 THOUSAND/UL (ref 0–0.2)
IMM GRANULOCYTES NFR BLD AUTO: 0 % (ref 0–2)
KETONES UR STRIP-MCNC: NEGATIVE MG/DL
LEUKOCYTE ESTERASE UR QL STRIP: NEGATIVE
LIPASE SERPL-CCNC: 129 U/L (ref 73–393)
LYMPHOCYTES # BLD AUTO: 3.08 THOUSANDS/ΜL (ref 0.6–4.47)
LYMPHOCYTES NFR BLD AUTO: 42 % (ref 14–44)
MCH RBC QN AUTO: 24.4 PG (ref 26.8–34.3)
MCHC RBC AUTO-ENTMCNC: 29.5 G/DL (ref 31.4–37.4)
MCV RBC AUTO: 83 FL (ref 82–98)
MONOCYTES # BLD AUTO: 0.45 THOUSAND/ΜL (ref 0.17–1.22)
MONOCYTES NFR BLD AUTO: 6 % (ref 4–12)
MUCOUS THREADS UR QL AUTO: ABNORMAL
NEUTROPHILS # BLD AUTO: 3.54 THOUSANDS/ΜL (ref 1.85–7.62)
NEUTS SEG NFR BLD AUTO: 48 % (ref 43–75)
NITRITE UR QL STRIP: POSITIVE
NON-SQ EPI CELLS URNS QL MICRO: ABNORMAL /HPF
NRBC BLD AUTO-RTO: 0 /100 WBCS
PH UR STRIP.AUTO: 5.5 [PH]
PLATELET # BLD AUTO: 440 THOUSANDS/UL (ref 149–390)
PMV BLD AUTO: 10.1 FL (ref 8.9–12.7)
POTASSIUM SERPL-SCNC: 4 MMOL/L (ref 3.5–5.3)
PROT SERPL-MCNC: 8.1 G/DL (ref 6.4–8.2)
PROT UR STRIP-MCNC: NEGATIVE MG/DL
RBC # BLD AUTO: 4.83 MILLION/UL (ref 3.81–5.12)
RBC #/AREA URNS AUTO: ABNORMAL /HPF
SODIUM SERPL-SCNC: 139 MMOL/L (ref 136–145)
SP GR UR STRIP.AUTO: >=1.03 (ref 1–1.03)
UROBILINOGEN UR QL STRIP.AUTO: 0.2 E.U./DL
WBC # BLD AUTO: 7.34 THOUSAND/UL (ref 4.31–10.16)
WBC #/AREA URNS AUTO: ABNORMAL /HPF

## 2021-03-29 PROCEDURE — G1004 CDSM NDSC: HCPCS

## 2021-03-29 PROCEDURE — 99284 EMERGENCY DEPT VISIT MOD MDM: CPT | Performed by: PHYSICIAN ASSISTANT

## 2021-03-29 PROCEDURE — 87186 SC STD MICRODIL/AGAR DIL: CPT | Performed by: PHYSICIAN ASSISTANT

## 2021-03-29 PROCEDURE — 81001 URINALYSIS AUTO W/SCOPE: CPT | Performed by: PHYSICIAN ASSISTANT

## 2021-03-29 PROCEDURE — 74177 CT ABD & PELVIS W/CONTRAST: CPT

## 2021-03-29 PROCEDURE — 83690 ASSAY OF LIPASE: CPT | Performed by: PHYSICIAN ASSISTANT

## 2021-03-29 PROCEDURE — 81025 URINE PREGNANCY TEST: CPT | Performed by: PHYSICIAN ASSISTANT

## 2021-03-29 PROCEDURE — 99284 EMERGENCY DEPT VISIT MOD MDM: CPT

## 2021-03-29 PROCEDURE — 85025 COMPLETE CBC W/AUTO DIFF WBC: CPT | Performed by: PHYSICIAN ASSISTANT

## 2021-03-29 PROCEDURE — 80053 COMPREHEN METABOLIC PANEL: CPT | Performed by: PHYSICIAN ASSISTANT

## 2021-03-29 PROCEDURE — 87077 CULTURE AEROBIC IDENTIFY: CPT | Performed by: PHYSICIAN ASSISTANT

## 2021-03-29 PROCEDURE — 87086 URINE CULTURE/COLONY COUNT: CPT | Performed by: PHYSICIAN ASSISTANT

## 2021-03-29 PROCEDURE — 36415 COLL VENOUS BLD VENIPUNCTURE: CPT | Performed by: PHYSICIAN ASSISTANT

## 2021-03-29 RX ADMIN — IOHEXOL 100 ML: 350 INJECTION, SOLUTION INTRAVENOUS at 07:16

## 2021-03-29 NOTE — ED PROVIDER NOTES
History  Chief Complaint   Patient presents with    Abdominal Pain     Patient reports RLQ pain and RL back pain for the last year  Patient denies NVD  Patient reports increased pain  34yo female with a history of hypothyroidism, anemia, and endometriosis presenting for evaluation of right lower quadrant abdominal pain that has been present for the past year  She states the pain occurs on a daily basis and denies any pattern to her pain  Pain is not related to position changes, eating, or bowel movements  Patient previous underwent diagnostic laparoscopy in October 2020 and was diagnosed with endometriosis  Fulguration was performed of the endometrial lesions  She reports that her pain resolved for about a week after surgery and then recurred  Patient has followed up with her OBGYN and she was told her pain should no longer be related to her endometriosis  Patient reports that her pain acutely worsened while at work today despite taking ibuprofen which prompted her to seek medical attention  Patient is otherwise asymptomatic  She denies fevers, chills, nausea, vomiting, diarrhea, constipation, dysuria, hematuria, vaginal discharge, and vaginal bleeding  Last menstrual period was about 10 days ago  History provided by:  Patient and medical records   used: No    Abdominal Pain  Pain location:  RLQ  Pain quality: sharp    Pain radiates to:  Back  Pain severity:  Moderate  Onset quality:  Gradual  Duration: One year    Timing:  Constant  Progression:  Worsening  Chronicity:  New  Context: not suspicious food intake and not trauma    Relieved by:  Nothing  Worsened by:  Nothing  Ineffective treatments:  NSAIDs  Associated symptoms: no anorexia, no belching, no chest pain, no chills, no constipation, no cough, no diarrhea, no dysuria, no fatigue, no fever, no hematuria, no nausea, no shortness of breath, no vaginal bleeding, no vaginal discharge and no vomiting        Prior to Admission Medications   Prescriptions Last Dose Informant Patient Reported? Taking? Junel FE 1/20 1-20 MG-MCG per tablet  Self Yes No   ergocalciferol (VITAMIN D2) 50,000 units  Self Yes No   Sig: Take 50,000 Units by mouth   ferrous sulfate 325 (65 Fe) mg tablet  Self Yes No   Sig: Take 325 mg by mouth   ibuprofen (MOTRIN) 400 mg tablet  Self Yes No   Sig: TAKE 1 TABLET (400 MG TOTAL) BY MOUTH EVERY 4 (FOUR) HOURS AS NEEDED FOR MILD PAIN (PAIN SCORE 1 3)  levothyroxine 50 mcg tablet  Self Yes No   Sig: TAKE 1 TABLET BY MOUTH EVERY DAY   nystatin (MYCOSTATIN) cream  Self Yes No   Sig: Apply topically 2 (two) times a day   oxyCODONE-acetaminophen (PERCOCET) 5-325 mg per tablet  Self Yes No   Sig: TAKE 1 TABLET BY MOUTH EVERY 4 (FOUR) HOURS AS NEEDED FOR MODERATE PAIN (PAIN SCORE 4 6)   pantoprazole (PROTONIX) 40 mg tablet  Self No No   Sig: Take 1 tablet (40 mg total) by mouth daily   pantoprazole (PROTONIX) 40 mg tablet   No No   Sig: TAKE 1 TABLET (40 MG TOTAL) BY MOUTH 2 (TWO) TIMES A DAY FOR 14 DAYS   triamcinolone (KENALOG) 0 1 % cream  Self Yes No   Sig: Apply topically 2 (two) times a day      Facility-Administered Medications: None       Past Medical History:   Diagnosis Date    Anemia     Disease of thyroid gland     Fibroids        Past Surgical History:   Procedure Laterality Date    THYROIDECTOMY         History reviewed  No pertinent family history  I have reviewed and agree with the history as documented      E-Cigarette/Vaping    E-Cigarette Use Never User      E-Cigarette/Vaping Substances    Nicotine No     THC No     CBD No     Flavoring No     Other No     Unknown No      Social History     Tobacco Use    Smoking status: Former Smoker     Packs/day: 0 25    Smokeless tobacco: Never Used    Tobacco comment: just quit    Substance Use Topics    Alcohol use: Not Currently     Comment: socially     Drug use: Never       Review of Systems   Constitutional: Negative for chills, fatigue and fever    HENT: Negative for congestion and drooling  Eyes: Negative for discharge and redness  Respiratory: Negative for cough and shortness of breath  Cardiovascular: Negative for chest pain and palpitations  Gastrointestinal: Positive for abdominal pain  Negative for anorexia, constipation, diarrhea, nausea and vomiting  Genitourinary: Negative for dysuria, hematuria, vaginal bleeding and vaginal discharge  Musculoskeletal: Negative for neck pain and neck stiffness  Skin: Negative for color change and rash  Neurological: Negative for seizures and syncope  Psychiatric/Behavioral: Negative for confusion  The patient is not nervous/anxious  All other systems reviewed and are negative  Physical Exam  Physical Exam  Vitals signs and nursing note reviewed  Constitutional:       General: She is not in acute distress  Appearance: She is well-developed  She is not diaphoretic  Comments: Non-toxic appearing   HENT:      Head: Normocephalic and atraumatic  Right Ear: External ear normal       Left Ear: External ear normal       Nose: Nose normal    Eyes:      General: No scleral icterus  Right eye: No discharge  Left eye: No discharge  Conjunctiva/sclera: Conjunctivae normal    Neck:      Musculoskeletal: Normal range of motion and neck supple  Cardiovascular:      Rate and Rhythm: Normal rate and regular rhythm  Heart sounds: Normal heart sounds  No murmur  Pulmonary:      Effort: Pulmonary effort is normal  No respiratory distress  Breath sounds: Normal breath sounds  No stridor  No wheezing or rales  Abdominal:      General: Bowel sounds are normal  There is no distension  Palpations: Abdomen is soft  Tenderness: There is no abdominal tenderness  There is no guarding  Comments: No abdominal or CVA tenderness on exam   Musculoskeletal: Normal range of motion  General: No deformity     Lymphadenopathy:      Cervical: No cervical adenopathy  Skin:     General: Skin is warm and dry  Neurological:      Mental Status: She is alert  She is not disoriented  GCS: GCS eye subscore is 4  GCS verbal subscore is 5  GCS motor subscore is 6  Psychiatric:         Behavior: Behavior normal          Vital Signs  ED Triage Vitals [03/29/21 0617]   Temperature Pulse Respirations Blood Pressure SpO2   (!) 97 2 °F (36 2 °C) 85 18 134/64 99 %      Temp Source Heart Rate Source Patient Position - Orthostatic VS BP Location FiO2 (%)   Tympanic Monitor Lying Left arm --      Pain Score       --           Vitals:    03/29/21 0617 03/29/21 0825   BP: 134/64 134/64   Pulse: 85 85   Patient Position - Orthostatic VS: Lying Sitting         Visual Acuity      ED Medications  Medications   iohexol (OMNIPAQUE) 350 MG/ML injection (SINGLE-DOSE) 100 mL (100 mL Intravenous Given 3/29/21 0716)       Diagnostic Studies  Results Reviewed     Procedure Component Value Units Date/Time    Urine culture [[id number]] Collected: 03/29/21 0630    Lab Status:  In process Specimen: Urine Updated: 03/29/21 0703    Comprehensive metabolic panel [[id number]]  (Abnormal) Collected: 03/29/21 0633    Lab Status: Final result Specimen: Blood from Arm, Right Updated: 03/29/21 0700     Sodium 139 mmol/L      Potassium 4 0 mmol/L      Chloride 104 mmol/L      CO2 24 mmol/L      ANION GAP 11 mmol/L      BUN 11 mg/dL      Creatinine 0 94 mg/dL      Glucose 129 mg/dL      Calcium 8 2 mg/dL      AST 12 U/L      ALT 21 U/L      Alkaline Phosphatase 55 U/L      Total Protein 8 1 g/dL      Albumin 3 5 g/dL      Total Bilirubin 0 11 mg/dL      eGFR 92 ml/min/1 73sq m     Narrative:      [place] guidelines for Chronic Kidney Disease (CKD):     Stage 1 with normal or high GFR (GFR > 90 mL/min/1 73 square meters)    Stage 2 Mild CKD (GFR = 60-89 mL/min/1 73 square meters)    Stage 3A Moderate CKD (GFR = 45-59 mL/min/1 73 square meters)    Stage 3B Moderate CKD (GFR = 30-44 mL/min/1 73 square meters)    Stage 4 Severe CKD (GFR = 15-29 mL/min/1 73 square meters)    Stage 5 End Stage CKD (GFR <15 mL/min/1 73 square meters)  Note: GFR calculation is accurate only with a steady state creatinine    Lipase [[id number]]  (Normal) Collected: 03/29/21 0633    Lab Status: Final result Specimen: Blood from Arm, Right Updated: 03/29/21 0700     Lipase 129 u/L     Urine Microscopic [[id number]]  (Abnormal) Collected: 03/29/21 0630    Lab Status: Final result Specimen: Urine, Clean Catch Updated: 03/29/21 0651     RBC, UA 0-1 /hpf      WBC, UA 0-1 /hpf      Epithelial Cells Occasional /hpf      Bacteria, UA Moderate /hpf      MUCUS THREADS Occasional    UA w Reflex to Microscopic w Reflex to Culture [[id number]]  (Abnormal) Collected: 03/29/21 0630    Lab Status: Final result Specimen: Urine, Clean Catch Updated: 03/29/21 0646     Color, UA Yellow     Clarity, UA Slightly Cloudy     Specific Gravity, UA >=1 030     pH, UA 5 5     Leukocytes, UA Negative     Nitrite, UA Positive     Protein, UA Negative mg/dl      Glucose, UA Negative mg/dl      Ketones, UA Negative mg/dl      Urobilinogen, UA 0 2 E U /dl      Bilirubin, UA Negative     Blood, UA Negative    CBC and differential [[id number]]  (Abnormal) Collected: 03/29/21 0633    Lab Status: Final result Specimen: Blood from Arm, Right Updated: 03/29/21 0638     WBC 7 34 Thousand/uL      RBC 4 83 Million/uL      Hemoglobin 11 8 g/dL      Hematocrit 40 0 %      MCV 83 fL      MCH 24 4 pg      MCHC 29 5 g/dL      RDW 16 3 %      MPV 10 1 fL      Platelets 248 Thousands/uL      nRBC 0 /100 WBCs      Neutrophils Relative 48 %      Immat GRANS % 0 %      Lymphocytes Relative 42 %      Monocytes Relative 6 %      Eosinophils Relative 3 %      Basophils Relative 1 %      Neutrophils Absolute 3 54 Thousands/µL      Immature Grans Absolute 0 01 Thousand/uL      Lymphocytes Absolute 3 08 Thousands/µL      Monocytes Absolute 0 45 Thousand/µL Eosinophils Absolute 0 19 Thousand/µL      Basophils Absolute 0 07 Thousands/µL     POCT pregnancy, urine [[id number]]  (Normal) Resulted: 03/29/21 0636    Lab Status: Final result Updated: 03/29/21 0637     EXT PREG TEST UR (Ref: Negative) Negative     Control Valid                 CT abdomen pelvis with contrast   Final Result by Ria Quiroga MD (03/29 2061)      No acute intra-abdominal pelvic abnormality identified  Stable liver lesions as above  Workstation performed: [id number]                    Procedures  Procedures         ED Course                             SBIRT 20yo+      Most Recent Value   SBIRT (25 yo +)   In order to provide better care to our patients, we are screening all of our patients for alcohol and drug use  Would it be okay to ask you these screening questions? Yes Filed at: 03/29/2021 7891   Initial Alcohol Screen: US AUDIT-C    1  How often do you have a drink containing alcohol?  0 Filed at: 03/29/2021 0619   2  How many drinks containing alcohol do you have on a typical day you are drinking? 0 Filed at: 03/29/2021 0619   3a  Male UNDER 65: How often do you have five or more drinks on one occasion? 0 Filed at: 03/29/2021 0619   3b  FEMALE Any Age, or MALE 65+: How often do you have 4 or more drinks on one occassion? 0 Filed at: 03/29/2021 0884   Audit-C Score  0 Filed at: 03/29/2021 8085   MARCOS: How many times in the past year have you    Used an illegal drug or used a prescription medication for non-medical reasons? Never Filed at: 03/29/2021 3038                    MDM  Number of Diagnoses or Management Options  Right lower quadrant pain: new and requires workup  Diagnosis management comments: 70-year-old female presenting for evaluation of right lower quadrant abdominal pain has been present for the past year  Pain radiates to the right lower back  She had an exploratory laparotomy in October 2020 and was diagnosed with endometriosis    Per patient, her OBGYN told her that her current pain was not related to this  She is otherwise asymptomatic  She has no abdominal or CVA tenderness on exam  Differential diagnosis includes but is not limited to: ovarian cyst, endometriosis, gastritis, GERD, pancreatitis, hepatitis, cholecystitis, cholelithiasis, colitis, diverticulitis, appendicitis, mesenteric adenitis, UTI, pyelonephritis, SBO, constipation, kidney stone, musculoskeletal, nonspecific abdominal pain  Initial ED plan: Check abdominal labs, UA, Upreg, and CT abdomen  She declines analgesics at this time  Final assessment:  Labs overall unremarkable  White count, renal function, electrolytes, LFTs, and lipase are normal   UA with moderate bacteria only 1-2 WBCs  No clear infection at this point and patient denies any urinary symptoms  Will send urine culture but hold on empiric abx  CT abdomen negative for acute findings  No indication for admission at this time  Unclear etiology pain, possibly related to her underlying endometriosis  She was encouraged to follow-up with her OBGYN for this  ED return precautions discussed  Patient expressed understanding and is agreeable to plan  Patient discharged in stable condition             Amount and/or Complexity of Data Reviewed  Clinical lab tests: ordered and reviewed  Tests in the radiology section of CPT®: ordered and reviewed  Review and summarize past medical records: yes  Independent visualization of images, tracings, or specimens: yes    Risk of Complications, Morbidity, and/or Mortality  Presenting problems: moderate  Diagnostic procedures: moderate  Management options: moderate    Patient Progress  Patient progress: stable      Disposition  Final diagnoses:   Right lower quadrant pain     Time reflects when diagnosis was documented in both MDM as applicable and the Disposition within this note     Time User Action Codes Description Comment    3/29/2021  8:12 AM [address] [place] [address] Geovanna Pham Add [R10 31] Right lower quadrant pain       ED Disposition     ED Disposition Condition Date/Time Comment    Discharge Stable Mon Mar 29, 2021  8:12 AM Rupert Collier discharge to home/self care  Follow-up Information     Follow up With Specialties Details Why Contact Info Additional Information    MILTON Flood Nurse Practitioner, Family Medicine Schedule an appointment as soon as possible for a visit   [address] [place] [address] [address] [place] [address]        [address] [facility] Emergency Department Emergency Medicine  If symptoms worsen [address] Emergency Department, [address] [place] [address], [id number]          Discharge Medication List as of 3/29/2021  8:14 AM      CONTINUE these medications which have NOT CHANGED    Details   ergocalciferol (VITAMIN D2) 50,000 units Take 50,000 Units by mouth, Starting Tue 9/1/2020, Until Tue 11/24/2020, Historical Med      ferrous sulfate 325 (65 Fe) mg tablet Take 325 mg by mouth, Starting Tue 9/1/2020, Historical Med      ibuprofen (MOTRIN) 400 mg tablet TAKE 1 TABLET (400 MG TOTAL) BY MOUTH EVERY 4 (FOUR) HOURS AS NEEDED FOR MILD PAIN (PAIN SCORE 1 3)  , Historical Med      Junel FE 1/20 1-20 MG-MCG per tablet Starting Tue 11/10/2020, Historical Med      levothyroxine 50 mcg tablet TAKE 1 TABLET BY MOUTH EVERY DAY, Historical Med      nystatin (MYCOSTATIN) cream Apply topically 2 (two) times a day, Starting Fri 5/29/2020, Until Sat 5/29/2021, Historical Med      oxyCODONE-acetaminophen (PERCOCET) 5-325 mg per tablet TAKE 1 TABLET BY MOUTH EVERY 4 (FOUR) HOURS AS NEEDED FOR MODERATE PAIN (PAIN SCORE 4 6), Historical Med      pantoprazole (PROTONIX) 40 mg tablet Take 1 tablet (40 mg total) by mouth daily, Starting Thu 9/17/2020, Normal      triamcinolone (KENALOG) 0 1 % cream Apply topically 2 (two) times a day, Starting Tue 7/7/2020, Until Wed 7/7/2021, Historical Med           No discharge procedures on file      PDMP Review     None          ED Provider  Electronically Signed by           Evert Batista PA-C  03/29/21 0395

## 2021-03-29 NOTE — TELEPHONE ENCOUNTER
----- Message from Hien Monge PA-C sent at 3/29/2021 10:08 AM EDT -----  Please let her know this was negative

## 2021-03-29 NOTE — DISCHARGE INSTRUCTIONS
Take Tylenol and Motrin as needed for pain  Apply heating pad to affected area  Follow-up with your OBGYN and your family doctor  Return to the ER with any worsening symptoms

## 2021-03-31 LAB
BACTERIA UR CULT: ABNORMAL
BACTERIA UR CULT: ABNORMAL

## 2021-03-31 RX ORDER — SULFAMETHOXAZOLE AND TRIMETHOPRIM 800; 160 MG/1; MG/1
1 TABLET ORAL 2 TIMES DAILY
Qty: 14 TABLET | Refills: 0 | Status: SHIPPED | OUTPATIENT
Start: 2021-03-31 | End: 2021-04-07

## 2021-03-31 NOTE — RESULT ENCOUNTER NOTE
First attempt  Left message on voicemail  Urine culture positive for E coli UTI  Patient needs treatment

## 2021-05-05 NOTE — PROGRESS NOTES
Diagnoses and all orders for this visit:    1  Encounter for annual routine gynecological examination   Healthy eating and nutrition, daily exercise discussed and SBE reinforced  Call with any issues and all questions and concerns addressed  2  Encounter for initial prescription of intrauterine contraceptive device (IUD)  Reviewed plan of insertion:  Nursing staff will notify if when IUD is ready and will help schedule insertion  Patient is can have appt scheduled at anytime, she is taking OCP  Patient instructed to premedicate 1 hour before appt with 600 mg of ibuprofen  Will then need to return in 6 weeks after insertion for a string check  3  Right lower quadrant abdominal pain  -     US pelvis complete w transvaginal; Future    4  Left breast lump  -     Mammo diagnostic left w 3d & cad; Future          All questions and concerns answered  Patient to call with any questions  Pleasant 31 y o  premenopausal female here for new patient annual exam  Cr Parra, She denies any issues with bleeding or her menses  Reports regular cycles on OCP  Has GYN hx of endometriosis, fibroid uterus, recent surgery for Dx Lap with fulguration of endometriosis lesions  [facility]  She had no improvement of her pain, states that she is in pain almost daily  Takes 800 mg of ibuprofen almost daily  Also completed CT Scan which showed liver lesions and she is under care of her PCP for these concerns, but it does not seem to be the cause of her pain  Hx of thyroidectomy, taking levothyroxine daily  She also c/o of left breast lump  Denies nipple discharge, skin changes, & F/C  Denies history of abnormal pap smears  Denies vaginal, urinary issues, today  Denies any issues with her BCM, which is generic Junel, regulates menses flow but not her endometrial pain  Patient is requesting to switch pill    Discussed other methods which would be better for for control pain, Mirena IUD as best option, then Depo Provera  Reviewed risks and side effects of both  Sexually active without any concerns and pt declines STD testing  Denies F/C/N/V  She did not receive the Gardasil vaccine, declines today  Health Maintenance:  Last PAP:  5/29/2020, PAP/HPV neg  Next PAP Due:  Due in 3 years      Past Medical History:   Diagnosis Date    Anemia     Disease of thyroid gland     Fibroids      Past Surgical History:   Procedure Laterality Date    THYROIDECTOMY       Family History   Problem Relation Age of Onset    Breast cancer additional onset Neg Hx     Ovarian cancer Neg Hx     Cervical cancer Neg Hx     Uterine cancer Neg Hx      Social History     Tobacco Use    Smoking status: Former Smoker     Packs/day: 0 25    Smokeless tobacco: Never Used    Tobacco comment: just quit    Substance Use Topics    Alcohol use: Yes     Frequency: Monthly or less     Comment: socially     Drug use: Never       Current Outpatient Medications:     ferrous sulfate 325 (65 Fe) mg tablet, Take 325 mg by mouth, Disp: , Rfl:     ibuprofen (MOTRIN) 400 mg tablet, TAKE 1 TABLET (400 MG TOTAL) BY MOUTH EVERY 4 (FOUR) HOURS AS NEEDED FOR MILD PAIN (PAIN SCORE 1 3)  , Disp: , Rfl:     Junel FE 1/20 1-20 MG-MCG per tablet, , Disp: , Rfl:     levothyroxine 50 mcg tablet, TAKE 1 TABLET BY MOUTH EVERY DAY, Disp: , Rfl:     oxyCODONE-acetaminophen (PERCOCET) 5-325 mg per tablet, TAKE 1 TABLET BY MOUTH EVERY 4 (FOUR) HOURS AS NEEDED FOR MODERATE PAIN (PAIN SCORE 4 6), Disp: , Rfl:     ergocalciferol (VITAMIN D2) 50,000 units, Take 50,000 Units by mouth, Disp: , Rfl:     pantoprazole (PROTONIX) 40 mg tablet, TAKE 1 TABLET (40 MG TOTAL) BY MOUTH 2 (TWO) TIMES A DAY FOR 14 DAYS, Disp: 28 tablet, Rfl: 0  Patient Active Problem List    Diagnosis Date Noted    Encounter for annual routine gynecological examination 05/12/2021    Encounter for initial prescription of intrauterine contraceptive device (IUD) 2021    Right lower quadrant abdominal pain 2021    Left breast lump 2021    Hypothyroidism 2020    Palpitations 2020    Acid reflux 2020       No Known Allergies    OB History    Para Term  AB Living   1 1 1         SAB TAB Ectopic Multiple Live Births                  # Outcome Date GA Lbr Prem/2nd Weight Sex Delivery Anes PTL Lv   1 Term                Vitals:    21 0734   BP: 124/86   BP Location: Left arm   Patient Position: Sitting   Weight: 129 kg (285 lb)   Height: 5' 4" (1 626 m)     Body mass index is 48 92 kg/m²  Review of Systems   Constitutional: Negative for chills, fatigue, fever and unexpected weight change  Respiratory: Negative for shortness of breath  Gastrointestinal: Negative for anal bleeding, blood in stool, constipation and diarrhea  Genitourinary: Negative for difficulty urinating, dysuria and hematuria  Physical Exam  Constitutional:       Appearance: Normal appearance  She is well-developed  She is obese  Genitourinary:      Inguinal canal, vagina, cervix, uterus, right adnexa, left adnexa and rectum normal    No labial fusion  No vaginal discharge  Cardiovascular:      Rate and Rhythm: Normal rate and regular rhythm  Heart sounds: Normal heart sounds  Pulmonary:      Effort: Pulmonary effort is normal       Breath sounds: Normal breath sounds  Chest:      Breasts:         Right: Normal  No inverted nipple, mass, nipple discharge, skin change or tenderness  Left: Mass and tenderness present  No inverted nipple, nipple discharge or skin change  Abdominal:      Palpations: Abdomen is soft  Musculoskeletal: Normal range of motion  Neurological:      Mental Status: She is alert and oriented to person, place, and time  Skin:     General: Skin is warm and dry  Psychiatric:         Behavior: Behavior normal          Thought Content:  Thought content normal          Judgment: Judgment normal    Vitals signs and nursing note reviewed  Physical Exam   Constitutional: She appears well-developed and well-nourished  No distress  Head: Normocephalic  Neck: Normal range of motion  Neck supple  Pulmonary: Effort normal   Breasts: right without masses, skin changes or nipple discharge  Bilaterally soft and warm to touch  No areas of erythema or pain on right  Abdominal: Soft  Non-tender, Morbid obesity  Pelvic exam was performed with patient supine  No labial fusion  There is no rash, tenderness, lesion or injury on the right labia  There is no rash, tenderness, lesion or injury on the left labia  Uterus is not deviated and not tender, unable to assess totally due to body habitus Cervix exhibits no motion tenderness, unable to visualize due to body habitus  Right adnexum displays no mass, no tenderness and no fullness  Left adnexum displays no mass, no tenderness and no fullness  No erythema or tenderness in the vagina  No foreign body in the vagina  No signs of injury around the vagina  No vaginal discharge found  Lymphadenopathy:        Right: No inguinal adenopathy present          Left: No inguinal adenopathy present

## 2021-05-05 NOTE — PATIENT INSTRUCTIONS
Birth Control Pills   WHAT YOU NEED TO KNOW:   What are birth control pills? Birth control pills are also called oral contraceptives, or the pill  It is medicine that helps prevent pregnancy by stopping ovulation  Ovulation is when the ovaries make and release an egg cell each month  If this egg gets fertilized by sperm, pregnancy occurs  You will need to take the pill at the same time every day  Your healthcare provider will tell you when to start taking the pill  You will also be told what to do if you miss a dose  Instructions will depend on the kind of birth control pills you are taking  What are the different kinds of birth control pills? Some kinds are taken for 21 days in a row, followed by 7 days of placebo (no hormones) pills  Other kinds are taken for 24 days followed by 4 days of placebos  Each kind has a certain amount of female hormones  Your provider will decide on the kind that is best for you based on your age and other health conditions  What may be done before I can start taking birth control pills? You need to see your healthcare provider to get a prescription  Any of the following may be done before your healthcare provider gives you a prescription:  · Your healthcare provider will ask about diseases and illnesses you have had in the past  Your provider will check your risk for blood clots, heart conditions, or stroke  Tell your provider if you had gastric bypass surgery  This surgery can affect the way your body absorbs medicines such as birth control pills  · Your provider will also check your blood pressure, and may do a breast and pelvic exam  A Pap smear may also be done during the pelvic exam  This is a test to make sure you do not have abnormal changes on your cervix  You may need other tests, such as a urine test to make sure you are not pregnant  · Your provider will ask if you take any medicines and if you smoke   Smoking increases your risk for stroke, heart attack, or a blood clot in your lungs  If you smoke, you should not take certain kinds of birth control pills  What are the advantages of birth control pills? When birth control pills are used correctly, the chances of getting pregnant are very low  Birth control pills may help decrease bleeding and pain during your monthly period  They may also help prevent cancer of the uterus and ovaries  What are the disadvantages of birth control pills? You may have sudden changes in your mood or feelings while you take birth control pills  You may have nausea and a decreased sex drive  You may have an increased appetite and rapid weight gain  You may also have bleeding in between periods, less frequent periods, vaginal dryness, and breast pain  Birth control pills will not protect you from sexually transmitted infections  Rarely, some birth control pills can increase your risk for a blood clot  This may become life-threatening  What should I do if I decide I want to get pregnant? If you are planning to have a baby, ask your healthcare provider when you may stop taking your birth control pills  It may take some time for you to start ovulating again  Ask your healthcare provider for more information about pregnancy after birth control pills  When should I start taking birth control pills after I have a baby? If you are not breastfeeding, you may start taking birth control pills 3 weeks after you give birth  You may be able to take certain types of birth control pills if you are breastfeeding  These pills can be started from 6 weeks to 6 months after you give birth  Ask your healthcare provider for more information about when to start taking birth control pills after you give birth  What do I need to know about birth control pills and menopause? · Talk with your healthcare provider if you want to take birth control pills around menopause  · Around age 39, you will enter into perimenopause   This means your hormone levels are dropping and you are ovulating less often  You can still become pregnant during this time  The risk for problems, such as miscarriage, are higher if you become pregnant after age 39  Birth control pills will prevent pregnancy, and may also help prevent or relieve some signs and symptoms of menopause  Examples are hot flashes and mood swings  · Your provider will do tests when you are around age 48  The tests may show that you are in menopause  If the tests do not show menopause for sure, you may be able to continue taking the pill up to age 54  The decision will depend on your health and if you have any medical conditions, such as a blood clot  Call your local emergency number (911 in the [address]) for any of the following:   · You have any of the following signs of a stroke:      ? Numbness or drooping on one side of your face     ? Weakness in an arm or leg    ? Confusion or difficulty speaking    ? Dizziness, a severe headache, or vision loss    · You feel lightheaded, short of breath, and have chest pain  · You cough up blood  When should I seek immediate care? · Your arm or leg feels warm, tender, and painful  It may look swollen and red  · You have severe pain, numbness, or swelling in your arms or legs  When should I call my doctor? · You have forgotten to take a birth control pill  · You have mood changes, such as depression, since starting birth control pills  · You have nausea or are vomiting  · You have severe abdominal pain  · You missed a period and have questions or concerns about being pregnant  · You still have bleeding 4 months after taking birth control pills correctly  · You have questions or concerns about your condition or care  CARE AGREEMENT:   You have the right to help plan your care  Learn about your health condition and how it may be treated  Discuss treatment options with your healthcare providers to decide what care you want to receive   You always have the right to refuse treatment  The above information is an  only  It is not intended as medical advice for individual conditions or treatments  Talk to your doctor, nurse or pharmacist before following any medical regimen to see if it is safe and effective for you  © Copyright [address] 2021 Information is for End User's use only and may not be sold, redistributed or otherwise used for commercial purposes   All illustrations and images included in CareNotes® are the copyrighted property of A D A M , Inc  or [address] [place] [address]

## 2021-05-06 ENCOUNTER — OFFICE VISIT (OUTPATIENT)
Dept: OBGYN CLINIC | Facility: CLINIC | Age: 36
End: 2021-05-06
Payer: COMMERCIAL

## 2021-05-06 VITALS
HEIGHT: 64 IN | BODY MASS INDEX: 48.65 KG/M2 | WEIGHT: 285 LBS | DIASTOLIC BLOOD PRESSURE: 86 MMHG | SYSTOLIC BLOOD PRESSURE: 124 MMHG

## 2021-05-06 DIAGNOSIS — Z30.014 ENCOUNTER FOR INITIAL PRESCRIPTION OF INTRAUTERINE CONTRACEPTIVE DEVICE (IUD): ICD-10-CM

## 2021-05-06 DIAGNOSIS — Z01.419 ENCOUNTER FOR ANNUAL ROUTINE GYNECOLOGICAL EXAMINATION: Primary | ICD-10-CM

## 2021-05-06 DIAGNOSIS — R10.31 RIGHT LOWER QUADRANT ABDOMINAL PAIN: ICD-10-CM

## 2021-05-06 DIAGNOSIS — N63.20 LEFT BREAST LUMP: ICD-10-CM

## 2021-05-06 PROCEDURE — S0610 ANNUAL GYNECOLOGICAL EXAMINA: HCPCS | Performed by: NURSE PRACTITIONER

## 2021-05-12 PROBLEM — N63.20 LEFT BREAST LUMP: Status: ACTIVE | Noted: 2021-05-12

## 2021-05-12 PROBLEM — Z01.419 ENCOUNTER FOR ANNUAL ROUTINE GYNECOLOGICAL EXAMINATION: Status: ACTIVE | Noted: 2021-05-12

## 2021-05-12 PROBLEM — R10.31 RIGHT LOWER QUADRANT ABDOMINAL PAIN: Status: ACTIVE | Noted: 2021-05-12

## 2021-05-12 PROBLEM — Z30.014 ENCOUNTER FOR INITIAL PRESCRIPTION OF INTRAUTERINE CONTRACEPTIVE DEVICE (IUD): Status: ACTIVE | Noted: 2021-05-12

## 2021-05-13 ENCOUNTER — HOSPITAL ENCOUNTER (OUTPATIENT)
Dept: MAMMOGRAPHY | Facility: CLINIC | Age: 36
Discharge: HOME | End: 2021-05-13
Payer: COMMERCIAL

## 2021-05-13 ENCOUNTER — TELEPHONE (OUTPATIENT)
Dept: OBGYN CLINIC | Facility: CLINIC | Age: 36
End: 2021-05-13

## 2021-05-13 ENCOUNTER — HOSPITAL ENCOUNTER (OUTPATIENT)
Dept: ULTRASOUND IMAGING | Facility: CLINIC | Age: 36
Discharge: HOME | End: 2021-05-13
Payer: COMMERCIAL

## 2021-05-13 VITALS — WEIGHT: 285 LBS | HEIGHT: 64 IN | BODY MASS INDEX: 48.65 KG/M2

## 2021-05-13 DIAGNOSIS — N63.0 BREAST LUMP: ICD-10-CM

## 2021-05-13 DIAGNOSIS — N63.20 LEFT BREAST LUMP: ICD-10-CM

## 2021-05-13 PROCEDURE — 76642 ULTRASOUND BREAST LIMITED: CPT

## 2021-05-13 PROCEDURE — G0279 TOMOSYNTHESIS, MAMMO: HCPCS

## 2021-05-13 PROCEDURE — 77066 DX MAMMO INCL CAD BI: CPT

## 2021-05-13 NOTE — TELEPHONE ENCOUNTER
----- Message from Lisa Liriano, [address] sent at 5/13/2021  8:46 AM EDT -----  Patient had diagnostic completed, probably knows her results, just checking to see if she desires further intervention of sebaceous cyst with breast specialist   Especially if she is having nay pain? Please refer Dr Bora Jc or Nancy Harrison if she is still having issues  Thanks!

## 2021-05-13 NOTE — TELEPHONE ENCOUNTER
----- Message from Sundar Nielsen sent at 5/13/2021  6:56 AM EDT -----  Regarding: Non-Urgent Medical Question  Contact: [phone]  I thought more about the IUD and it makes me nervous to have something like that in my body for an extended period of time  I at first was excited to have the pain finally go away but I don't like the IUD idea anymore  It scares me a bit  Can we figure out something else?

## 2021-05-23 ENCOUNTER — HOSPITAL ENCOUNTER (OUTPATIENT)
Dept: ULTRASOUND IMAGING | Facility: HOSPITAL | Age: 36
Discharge: HOME | End: 2021-05-23
Payer: COMMERCIAL

## 2021-05-23 DIAGNOSIS — R10.31 RIGHT LOWER QUADRANT ABDOMINAL PAIN: ICD-10-CM

## 2021-05-23 PROCEDURE — 76830 TRANSVAGINAL US NON-OB: CPT

## 2021-05-23 PROCEDURE — 76856 US EXAM PELVIC COMPLETE: CPT

## 2021-05-27 ENCOUNTER — TELEPHONE (OUTPATIENT)
Dept: OBGYN CLINIC | Facility: CLINIC | Age: 36
End: 2021-05-27

## 2021-05-27 NOTE — TELEPHONE ENCOUNTER
----- Message from Raisa Freire sent at 5/26/2021  6:01 PM EDT -----  Regarding: Test Results Question  Contact: [phone]  Did you get ultrasound results yet? I can't take this pain much longer  Are there stronger pain meds that you can prescribe me?

## 2021-05-27 NOTE — TELEPHONE ENCOUNTER
Roma from [facility] lm on machine to [place]:     They did not receive anything on Chey as KELSEY

## 2021-06-01 DIAGNOSIS — N94.6 DYSMENORRHEA: Primary | ICD-10-CM

## 2021-06-01 RX ORDER — IBUPROFEN 800 MG/1
800 TABLET ORAL EVERY 6 HOURS PRN
Qty: 30 TABLET | Refills: 2 | Status: SHIPPED | OUTPATIENT
Start: 2021-06-01 | End: 2021-07-16

## 2021-06-01 NOTE — TELEPHONE ENCOUNTER
Please read u/s and advise  Thx  Pt given appt with CS to discuss treAtment of fibroids  Pt aware of motrin rx - take with food

## 2021-06-02 ENCOUNTER — TELEPHONE (OUTPATIENT)
Dept: OBGYN CLINIC | Facility: CLINIC | Age: 36
End: 2021-06-02

## 2021-06-02 NOTE — TELEPHONE ENCOUNTER
----- Message from Barbara Cano sent at 6/1/2021  9:02 AM EDT -----  Please let pt know that I sent RX for motrin 800mg for her  Please take with food  If she needs anything stronger will need to discuss with one of our MDs  I would like her to see them to discuss management options for her  She is willing to try Mirena IUD, I would recommend they insert due to multiple fibroids and/or discussing other options due to her pain levels  Thanks!

## 2021-07-12 ENCOUNTER — OFFICE VISIT (OUTPATIENT)
Dept: OBGYN CLINIC | Facility: CLINIC | Age: 36
End: 2021-07-12
Payer: COMMERCIAL

## 2021-07-12 VITALS — SYSTOLIC BLOOD PRESSURE: 130 MMHG | WEIGHT: 292 LBS | BODY MASS INDEX: 50.12 KG/M2 | DIASTOLIC BLOOD PRESSURE: 78 MMHG

## 2021-07-12 DIAGNOSIS — N80.9 ENDOMETRIOSIS DETERMINED BY LAPAROSCOPY: Primary | ICD-10-CM

## 2021-07-12 PROCEDURE — 99214 OFFICE O/P EST MOD 30 MIN: CPT | Performed by: STUDENT IN AN ORGANIZED HEALTH CARE EDUCATION/TRAINING PROGRAM

## 2021-07-12 RX ORDER — FERROUS SULFATE 325(65) MG
325 TABLET ORAL
COMMUNITY
Start: 2021-02-28

## 2021-07-12 NOTE — PROGRESS NOTES
Assessment/Plan:        Endometriosis determined by laparoscopy  We discussed pathophysiology of endometriosis and options at length: continue OCP, IUD, depo-provera, lupron, orilissa  We discussed that lupron and Deyanira [place] can cause vasomotor symptoms and can only be used up to 2 years maximum  Discussed treatments for fibroids - would not recommend myomectomy given small size of fibroids  Given little relief following last laparoscopy would not recommend repeat at this time  Discussed possible need for hysterectomy in the future after child-bearing complete  Samara Latif will research options and be in touch w/ questions/decision  Other orders  -     ferrous sulfate 325 (65 Fe) mg tablet; Take 325 mg by mouth          Subjective:     Patient ID: Linda Mane is a 31 y o  female  30 yo  presents for follow up of endometriosis  Reports back and pelvic pain for the past several years  She did have a laparoscopy w/ fulguration at [facility] in 2020 which showed endometriosis  She had relief for about a week and then pain returned  She has been taking an OCP + ibuprofen but notices little relief  She continues to have some pain every day, but severity fluctuates  Pain is mostly on right side and wraps around to her back but sometimes can encompass entire pelvis  Pelvic US shows fibroid uterus - largest fibroid measuring 3 cm  She had previously agreed to IUD but is concerned about the idea of having a foreign body in place  She does hope to have pregnancies in the future  She has occasional constipation  Denies urinary symptoms  Denies dyspareunia  Review of Systems   Genitourinary: Positive for menstrual problem and pelvic pain  Objective:     Physical Exam  Vitals reviewed  Pulmonary:      Effort: Pulmonary effort is normal    Neurological:      Mental Status: She is alert and oriented to person, place, and time     Psychiatric:         Behavior: Behavior normal

## 2021-07-16 ENCOUNTER — PATIENT MESSAGE (OUTPATIENT)
Dept: OBGYN CLINIC | Facility: CLINIC | Age: 36
End: 2021-07-16

## 2021-07-16 ENCOUNTER — TELEPHONE (OUTPATIENT)
Dept: OBGYN CLINIC | Facility: CLINIC | Age: 36
End: 2021-07-16

## 2021-07-16 DIAGNOSIS — N94.6 DYSMENORRHEA: ICD-10-CM

## 2021-07-16 RX ORDER — IBUPROFEN 800 MG/1
800 TABLET ORAL EVERY 6 HOURS PRN
Qty: 30 TABLET | Refills: 2 | Status: SHIPPED | OUTPATIENT
Start: 2021-07-16

## 2021-07-16 NOTE — TELEPHONE ENCOUNTER
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 I would want to achieve extra time to complete my course work when the pain sometimes may be crippling  If possible you can email letter to me at [email or web address]; or fax the letter to [phone]  Attention: Disability Services

## 2021-07-21 NOTE — TELEPHONE ENCOUNTER
lmtcb number on file since no consent  Sent pt advisement via reply to original message in my chart

## 2021-07-21 NOTE — TELEPHONE ENCOUNTER
I would recommend she choose one of the treatment options we discussed to attempt to gain control of her pain  Rather than having a letter up front, would rather she call with pain crisis and be evaluated prn and if note for school is needed at that time then we can provide one if appropriate

## 2021-07-28 ENCOUNTER — TELEPHONE (OUTPATIENT)
Dept: OBGYN CLINIC | Facility: CLINIC | Age: 36
End: 2021-07-28

## 2021-07-28 DIAGNOSIS — N80.9 ENDOMETRIOSIS DETERMINED BY LAPAROSCOPY: Primary | ICD-10-CM

## 2021-07-28 NOTE — TELEPHONE ENCOUNTER
----- Message from Vickie Jewell sent at 7/28/2021 12:53 PM EDT -----  Regarding: Non-Urgent Medical Question  Contact: [phone]  Negate my prior messages   I'll try orlissa instead

## 2021-07-29 NOTE — TELEPHONE ENCOUNTER
Lupron and Waynetta Burgess can have similar side effects such as hot flashes or night sweats  For women with endometriosis, the pain relief benefit usually outweighs the side effects, but can effect each pt differently  The nice thing about Waynetta Burgess is it's a pill, so it can be stopped anytime  The lupron is an injection so stays in the system for 3 months  If she is interested in Waynetta Burgess will start at the lowest dose and recommend f/u in 4 months   thanks

## 2021-07-31 RX ORDER — ELAGOLIX 150 MG/1
TABLET, FILM COATED ORAL
Qty: 30 TABLET | Refills: 3 | Status: SHIPPED | OUTPATIENT
Start: 2021-07-31 | End: 2021-11-25

## 2021-08-26 ENCOUNTER — TELEPHONE (OUTPATIENT)
Dept: OBGYN CLINIC | Facility: CLINIC | Age: 36
End: 2021-08-26

## 2021-08-26 NOTE — TELEPHONE ENCOUNTER
----- Message from Dung Antunez sent at 8/26/2021  2:12 AM EDT -----  Regarding: Prescription Question  Contact: [phone]  Is it possible to get more ibuprofen refills?

## 2021-11-25 DIAGNOSIS — N80.9 ENDOMETRIOSIS DETERMINED BY LAPAROSCOPY: ICD-10-CM

## 2021-11-25 RX ORDER — ELAGOLIX 150 MG/1
TABLET, FILM COATED ORAL
Qty: 28 TABLET | Refills: 4 | Status: SHIPPED | OUTPATIENT
Start: 2021-11-25

## 2022-09-20 ENCOUNTER — LAB REQUISITION (OUTPATIENT)
Dept: LAB | Facility: HOSPITAL | Age: 37
End: 2022-09-20
Payer: COMMERCIAL

## 2022-09-20 DIAGNOSIS — L91.8 OTHER HYPERTROPHIC DISORDERS OF THE SKIN: ICD-10-CM

## 2022-09-20 DIAGNOSIS — L91.0 HYPERTROPHIC SCAR: ICD-10-CM

## 2022-09-20 PROCEDURE — 88304 TISSUE EXAM BY PATHOLOGIST: CPT | Performed by: PATHOLOGY

## 2022-09-23 PROCEDURE — 88304 TISSUE EXAM BY PATHOLOGIST: CPT | Performed by: PATHOLOGY

## 2022-09-27 ENCOUNTER — OFFICE VISIT (OUTPATIENT)
Dept: GASTROENTEROLOGY | Facility: CLINIC | Age: 37
End: 2022-09-27
Payer: COMMERCIAL

## 2022-09-27 VITALS
HEART RATE: 85 BPM | WEIGHT: 270 LBS | SYSTOLIC BLOOD PRESSURE: 120 MMHG | BODY MASS INDEX: 47.84 KG/M2 | HEIGHT: 63 IN | DIASTOLIC BLOOD PRESSURE: 84 MMHG | OXYGEN SATURATION: 98 %

## 2022-09-27 DIAGNOSIS — R10.31 RIGHT LOWER QUADRANT ABDOMINAL PAIN: Primary | ICD-10-CM

## 2022-09-27 DIAGNOSIS — R14.1 GAS PAIN: ICD-10-CM

## 2022-09-27 PROCEDURE — 99213 OFFICE O/P EST LOW 20 MIN: CPT | Performed by: PHYSICIAN ASSISTANT

## 2022-09-27 RX ORDER — CEPHALEXIN 500 MG/1
CAPSULE ORAL
COMMUNITY
Start: 2022-09-08

## 2022-09-27 RX ORDER — CYCLOBENZAPRINE HCL 5 MG
5-10 TABLET ORAL 2 TIMES DAILY PRN
COMMUNITY
Start: 2022-04-20

## 2022-09-27 RX ORDER — ELAGOLIX 200 MG/1
TABLET, FILM COATED ORAL
COMMUNITY
Start: 2022-09-19

## 2022-09-27 RX ORDER — TRAMADOL HYDROCHLORIDE 50 MG/1
TABLET ORAL
COMMUNITY
Start: 2022-06-01

## 2022-09-27 RX ORDER — HYDROCODONE BITARTRATE AND ACETAMINOPHEN 5; 325 MG/1; MG/1
TABLET ORAL
COMMUNITY
Start: 2022-09-08

## 2022-09-27 RX ORDER — FAMOTIDINE 20 MG/1
20 TABLET, FILM COATED ORAL 2 TIMES DAILY
COMMUNITY
Start: 2021-11-29 | End: 2022-11-29

## 2022-09-27 RX ORDER — AMITRIPTYLINE HYDROCHLORIDE 25 MG/1
25 TABLET, FILM COATED ORAL
Qty: 30 TABLET | Refills: 3 | Status: SHIPPED | OUTPATIENT
Start: 2022-09-27 | End: 2022-10-21

## 2022-09-27 NOTE — PROGRESS NOTES
[facility] - Outpatient Follow-up Note  Vikash Hernandez 30 y o  female MRN: [id number]  Encounter: [id number]          ASSESSMENT AND PLAN:      1  Right lower quadrant abdominal pain  With radiation to her right lower back  She has a history of endometriosis s/p laparoscopy in 2020 with adhesiolysis  Imaging has been consistently normal  She has no other lower GI symptoms  Will trial Amitriptyline 25mg at bedtime    She has not had a colonoscopy  Given her lack of other symptoms we will hold off on this but can consider at a future appt    2  Gas pain  New  Seems related to a recent change in her diet - she cut out meat and has therefore increased fiber intake  Start digestive advantage  Use Gas-X PRN    ______________________________________________________________________    SUBJECTIVE:  82-year-old female with chronic right lower quadrant abdominal pain, history of endometriosis, abdominal adhesions, GERD, H pylori who presents for evaluation of new gaseousness and abdominal pains  She notes that approximately a month ago she changed her diet to cut meat out  She has therefore increased her fiber intake  Since that time she has noticed gaseousness, bloating and pains associated with gas  There has been no change in her bowel movements  She reports that these are regular and predictable  She denies any change in the pain with bowel movements  There has been no rectal bleeding or unexpected weight loss  She notes that as soon as she passes the gas she does feel better  She also struggles with a chronic right lower quadrant and right lower back pain  She has been told that this is due to endometriosis or uterine fibroids by her gynecologist   There has been a push in the past for her to undergo a complete hysterectomy by them but she is trying to avoid this as she is only 30  She is on Orlissa from gyn and she does not get menstrual periods anymore    She denies that there is any cyclical component to the pain  She has tried dicyclomine without relief  She has had multiple images  most recent of which was a pelvic ultrasound in 2021  This showed multiple uterine masses likely fibroids without any suspicious lesions specifically normal ovaries  She is using ibuprofen as needed for the pain which does help although she knows that this is not good for her stomach and so she does try to avoid it  She reports that if she takes ibuprofen she will take a Pepcid with it  She has a history of known hepatic lesions which have been documented to be hemangiomas  REVIEW OF SYSTEMS IS OTHERWISE NEGATIVE        Historical Information   Past Medical History:   Diagnosis Date    Anemia     Disease of thyroid gland     Fibroids      Past Surgical History:   Procedure Laterality Date    THYROIDECTOMY       Social History   Social History     Substance and Sexual Activity   Alcohol Use Yes    Comment: socially      Social History     Substance and Sexual Activity   Drug Use Never     Social History     Tobacco Use   Smoking Status Former Smoker    Packs/day: 0 25   Smokeless Tobacco Never Used   Tobacco Comment    just quit      Family History   Problem Relation Age of Onset    No Known Problems Mother     No Known Problems Father     No Known Problems Sister     No Known Problems Sister     No Known Problems Sister     No Known Problems Sister     Breast cancer additional onset Neg Hx     Ovarian cancer Neg Hx     Cervical cancer Neg Hx     Uterine cancer Neg Hx        Meds/Allergies       Current Outpatient Medications:     amitriptyline (ELAVIL) 25 mg tablet    cephalexin (KEFLEX) 500 mg capsule    cyclobenzaprine (FLEXERIL) 5 mg tablet    Elagolix Sodium (Orilissa) 150 MG TABS    famotidine (PEPCID) 20 mg tablet    HYDROcodone-acetaminophen (NORCO) 5-325 mg per tablet    ibuprofen (MOTRIN) 800 mg tablet    levothyroxine 50 mcg tablet    Orilissa 200 MG TABS   oxyCODONE-acetaminophen (PERCOCET) 5-325 mg per tablet    traMADol (ULTRAM) 50 mg tablet    ergocalciferol (VITAMIN D2) 50,000 units    No Known Allergies        Objective     Blood pressure 120/84, pulse 85, height 5' 3" (1 6 m), weight 122 kg (270 lb), SpO2 98 %, not currently breastfeeding  Body mass index is 47 83 kg/m²  PHYSICAL EXAM:      General Appearance:   Alert, cooperative, no distress   HEENT:   Normocephalic, atraumatic, anicteric      Neck:  Supple, symmetrical, trachea midline   Lungs:   Clear to auscultation bilaterally; no rales, rhonchi or wheezing; respirations unlabored    Heart[de-identified]   Regular rate and rhythm; no murmur, rub, or gallop  Abdomen:   Soft, non-tender, non-distended; normal bowel sounds; no masses, no organomegaly    Genitalia:   Deferred    Rectal:   Deferred    Extremities:  No cyanosis, clubbing or edema    Pulses:  2+ and symmetric    Skin:  No jaundice, rashes, or lesions    Lymph nodes:  No palpable cervical lymphadenopathy        Lab Results:   No visits with results within 1 Day(s) from this visit     Latest known visit with results is:   Lab Requisition on 09/20/2022   Component Date Value    Case Report 09/20/2022                      Value:Surgical Pathology Report                         Case: [id number]                                   Authorizing Provider:  Jennifer Boone MD         Collected:           09/20/2022 0000              Ordering Location:     [address] [place] [address]      Received:            09/20/2022 [address] [place] [address] Specialty                                                                                  Laboratory                                                                   Pathologist:           Star Hobson MD                                                         Specimens:   A) - Ear, Left, Keloid left earlobe                                                                 B) - Ear, Right, keloid right earlobe                                                               C) - Skin, Cyst/Tag/Debridement, left thigh mass                                           Final Diagnosis 09/20/2022                      Value: This result contains rich text formatting which cannot be displayed here   Additional Information 09/20/2022                      Value: This result contains rich text formatting which cannot be displayed here  Jey Marks Description 09/20/2022                      Value: This result contains rich text formatting which cannot be displayed here   Clinical Information 09/20/2022                      Value:L91 0  L91 8         Radiology Results:   No results found    Answers for HPI/ROS submitted by the patient on 9/23/2022  Chronicity: chronic  Onset: more than 1 year ago  Onset quality: gradual  Frequency: 2 to 4 times per day  Episode duration: 3 hours  Progression since onset: waxing and waning  Pain location: right flank  Pain - numeric: 10/10  Pain quality: aching, cramping, dull, sharp  Radiates to: left flank, pelvis  Aggravated by: nothing

## 2022-10-13 DIAGNOSIS — R10.31 RIGHT LOWER QUADRANT ABDOMINAL PAIN: Primary | ICD-10-CM

## 2022-10-13 RX ORDER — DICYCLOMINE HCL 20 MG
20 TABLET ORAL EVERY 6 HOURS PRN
Qty: 60 TABLET | Refills: 0 | Status: SHIPPED | OUTPATIENT
Start: 2022-10-13 | End: 2022-10-21

## 2022-10-13 NOTE — TELEPHONE ENCOUNTER
Spoke with patient  History of RLQ pain, gas pain    Patient c/o continued RLQ pain sharp  Taking 1 Ibuprofen 200mg daily without relief  Denies n/v, fever, chills, SOB weakness, black or bloody stools  Patient is agreeable to try benyl 20mg q6h PRN

## 2022-10-13 NOTE — TELEPHONE ENCOUNTER
----- Message from [facility] sent at 10/13/2022  3:21 PM EDT -----  Regarding: Meds  Would it be possible to prescribe me something stronger than ibuprofen for the pain? These past few days have been extremely unbearable and I could only take the pills you gave me to bed

## 2022-10-21 DIAGNOSIS — R10.31 RIGHT LOWER QUADRANT ABDOMINAL PAIN: ICD-10-CM

## 2022-10-21 RX ORDER — DICYCLOMINE HCL 20 MG
20 TABLET ORAL EVERY 6 HOURS PRN
Qty: 360 TABLET | Refills: 1 | Status: SHIPPED | OUTPATIENT
Start: 2022-10-21

## 2022-10-21 RX ORDER — AMITRIPTYLINE HYDROCHLORIDE 25 MG/1
TABLET, FILM COATED ORAL
Qty: 90 TABLET | Refills: 2 | Status: SHIPPED | OUTPATIENT
Start: 2022-10-21

## 2022-11-08 ENCOUNTER — OFFICE VISIT (OUTPATIENT)
Dept: GASTROENTEROLOGY | Facility: CLINIC | Age: 37
End: 2022-11-08

## 2022-11-08 VITALS
DIASTOLIC BLOOD PRESSURE: 76 MMHG | WEIGHT: 274 LBS | SYSTOLIC BLOOD PRESSURE: 118 MMHG | HEIGHT: 63 IN | HEART RATE: 102 BPM | OXYGEN SATURATION: 99 % | BODY MASS INDEX: 48.55 KG/M2

## 2022-11-08 DIAGNOSIS — R10.31 RIGHT LOWER QUADRANT ABDOMINAL PAIN: Primary | ICD-10-CM

## 2022-11-08 RX ORDER — DICYCLOMINE HCL 20 MG
20 TABLET ORAL EVERY 6 HOURS PRN
Qty: 360 TABLET | Refills: 1 | Status: SHIPPED | OUTPATIENT
Start: 2022-11-08

## 2022-11-08 RX ORDER — AMITRIPTYLINE HYDROCHLORIDE 50 MG/1
25 TABLET, FILM COATED ORAL
Qty: 30 TABLET | Refills: 3 | Status: SHIPPED | OUTPATIENT
Start: 2022-11-08

## 2022-11-08 NOTE — PROGRESS NOTES
[facility] - Outpatient Follow-up Note  Anette Dixon 30 y o  female MRN: [id number]  Encounter: [id number]          ASSESSMENT AND PLAN:      1  Right lower quadrant abdominal pain  She continues with pain despite Amitriptyline 25mg daily  She has never had a colonoscopy - will schedule  Increase Amitriptyline to 50mg qhs    She has a history of endometriosis with a history of adhesions s/p adhesiolysis in 2020 - she had pain the same area at that time  She has known fibroids  If normal colonoscopy and no response to increased dose of Amitriptyline advise gyn f/u    ______________________________________________________________________    SUBJECTIVE:  46-year-old female with a history of endometriosis and abdominal adhesive disease presents for follow-up for right lower quadrant abdominal pain  She is taking amitriptyline 25 mg daily with no improvement in her symptoms  She continues to deny any other gastrointestinal symptoms such as diarrhea, constipation, rectal bleeding, nausea, vomiting or bloating  She underwent an exploratory laparoscopy with adhesiolysis in 2020  At that time she was having pain in the exact same spot  After the adhesiolysis she notes several months of pain improvement before returned  She has known uterine fibroids  Her last pelvic ultrasound was in May of 2021  REVIEW OF SYSTEMS IS OTHERWISE NEGATIVE        Historical Information   Past Medical History:   Diagnosis Date   • Anemia    • Disease of thyroid gland    • Fibroids      Past Surgical History:   Procedure Laterality Date   • THYROIDECTOMY       Social History   Social History     Substance and Sexual Activity   Alcohol Use Yes    Comment: socially      Social History     Substance and Sexual Activity   Drug Use Never     Social History     Tobacco Use   Smoking Status Former Smoker   • Packs/day: 0 25   Smokeless Tobacco Never Used   Tobacco Comment    just quit      Family History Problem Relation Age of Onset   • No Known Problems Mother    • No Known Problems Father    • No Known Problems Sister    • No Known Problems Sister    • No Known Problems Sister    • No Known Problems Sister    • Breast cancer additional onset Neg Hx    • Ovarian cancer Neg Hx    • Cervical cancer Neg Hx    • Uterine cancer Neg Hx        Meds/Allergies       Current Outpatient Medications:   •  amitriptyline (ELAVIL) 50 mg tablet  •  cyclobenzaprine (FLEXERIL) 5 mg tablet  •  dicyclomine (BENTYL) 20 mg tablet  •  famotidine (PEPCID) 20 mg tablet  •  ibuprofen (MOTRIN) 800 mg tablet  •  levothyroxine 50 mcg tablet  •  Orilissa 200 MG TABS    No Known Allergies        Objective     Blood pressure 118/76, pulse 102, height 5' 3" (1 6 m), weight 124 kg (274 lb), SpO2 99 %, not currently breastfeeding  Body mass index is 48 54 kg/m²  PHYSICAL EXAM:      General Appearance:   Alert, cooperative, no distress   HEENT:   Normocephalic, atraumatic, anicteric      Neck:  Supple, symmetrical, trachea midline   Lungs:   Clear to auscultation bilaterally; no rales, rhonchi or wheezing; respirations unlabored    Heart[de-identified]   Regular rate and rhythm; no murmur, rub, or gallop  Abdomen:   Soft, non-tender, non-distended; normal bowel sounds; no masses, no organomegaly    Genitalia:   Deferred    Rectal:   Deferred    Extremities:  No cyanosis, clubbing or edema    Pulses:  2+ and symmetric    Skin:  No jaundice, rashes, or lesions    Lymph nodes:  No palpable cervical lymphadenopathy        Lab Results:   No visits with results within 1 Day(s) from this visit     Latest known visit with results is:   Lab Requisition on 09/20/2022   Component Date Value   • Case Report 09/20/2022                      Value:Surgical Pathology Report                         Case: [id number]                                   Authorizing Provider:  Enrique Plummer MD         Collected:           09/20/2022 0000              Ordering Location: [facility]      Received:            09/20/2022 [address] [facility]                                                                                  Laboratory                                                                   Pathologist:           Krystle Paniagua MD                                                         Specimens:   A) - Ear, Left, Keloid left earlobe                                                                 B) - Ear, Right, keloid right earlobe                                                               C) - Skin, Cyst/Tag/Debridement, left thigh mass                                          • Final Diagnosis 09/20/2022                      Value: This result contains rich text formatting which cannot be displayed here  • Additional Information 09/20/2022                      Value: This result contains rich text formatting which cannot be displayed here  • Gross Description 09/20/2022                      Value: This result contains rich text formatting which cannot be displayed here  • Clinical Information 09/20/2022                      Value:L91 0  L91 8         Radiology Results:   No results found  Answers for HPI/ROS submitted by the patient on 11/8/2022  Chronicity: chronic  Onset: more than 1 year ago  Onset quality: gradual  Frequency: 2 to 4 times per day  Episode duration: 3 hours  Progression since onset: gradually worsening  Pain location: right flank  Pain - numeric: 10/10  Pain quality: aching, cramping, dull, sharp  Radiates to: back, left flank  anorexia: No  arthralgias: No  belching: No  constipation: No  diarrhea: No  dysuria: No  fever: No  flatus: No  frequency: No  headaches: No  hematochezia: No  hematuria: No  melena: No  myalgias: No  nausea:  No  weight loss: No  vomiting: No  Aggravated by: nothing  Relieved by: nothing  Diagnostic workup: CT scan, ultrasound

## 2022-11-08 NOTE — PATIENT INSTRUCTIONS
Scheduled date of colonoscopy (as of today):1/12/23  Physician performing colonoscopy:Aron  Location of colonoscopy:[place]  Bowel prep reviewed with patient:Denny/Miralax  Instructions reviewed with patient by:Yaniv norton  Clearances:  none

## 2022-11-22 DIAGNOSIS — R10.31 RIGHT LOWER QUADRANT ABDOMINAL PAIN: ICD-10-CM

## 2022-11-22 RX ORDER — DICYCLOMINE HCL 20 MG
20 TABLET ORAL EVERY 6 HOURS PRN
Qty: 360 TABLET | Refills: 0 | Status: SHIPPED | OUTPATIENT
Start: 2022-11-22

## 2022-11-22 NOTE — TELEPHONE ENCOUNTER
Pt request refill, bentyl,  script was sent 11/8/2022    Called pt    Pt stated she was notified to  meds but when she went to  she was told she can't get meds until Sury[place]     Called pharmacy, error, will be getting pt her medication dicyclomine ready and will call pt when ready for her to     Routing to pa for [facility]

## 2023-01-12 ENCOUNTER — HOSPITAL ENCOUNTER (OUTPATIENT)
Dept: GASTROENTEROLOGY | Facility: HOSPITAL | Age: 38
Setting detail: OUTPATIENT SURGERY
End: 2023-01-12

## 2023-01-12 ENCOUNTER — ANESTHESIA EVENT (OUTPATIENT)
Dept: GASTROENTEROLOGY | Facility: HOSPITAL | Age: 38
End: 2023-01-12

## 2023-01-12 ENCOUNTER — ANESTHESIA (OUTPATIENT)
Dept: GASTROENTEROLOGY | Facility: HOSPITAL | Age: 38
End: 2023-01-12

## 2023-01-12 VITALS
BODY MASS INDEX: 49.8 KG/M2 | DIASTOLIC BLOOD PRESSURE: 74 MMHG | TEMPERATURE: 97.8 F | SYSTOLIC BLOOD PRESSURE: 119 MMHG | HEIGHT: 63 IN | WEIGHT: 281.09 LBS | RESPIRATION RATE: 16 BRPM | HEART RATE: 89 BPM | OXYGEN SATURATION: 99 %

## 2023-01-12 DIAGNOSIS — R10.31 RIGHT LOWER QUADRANT ABDOMINAL PAIN: ICD-10-CM

## 2023-01-12 LAB
EXT PREGNANCY TEST URINE: NEGATIVE
EXT. CONTROL: NORMAL

## 2023-01-12 RX ORDER — SODIUM CHLORIDE, SODIUM LACTATE, POTASSIUM CHLORIDE, CALCIUM CHLORIDE 600; 310; 30; 20 MG/100ML; MG/100ML; MG/100ML; MG/100ML
INJECTION, SOLUTION INTRAVENOUS CONTINUOUS PRN
Status: DISCONTINUED | OUTPATIENT
Start: 2023-01-12 | End: 2023-01-12

## 2023-01-12 RX ORDER — SODIUM CHLORIDE, SODIUM LACTATE, POTASSIUM CHLORIDE, CALCIUM CHLORIDE 600; 310; 30; 20 MG/100ML; MG/100ML; MG/100ML; MG/100ML
125 INJECTION, SOLUTION INTRAVENOUS CONTINUOUS
Status: CANCELLED | OUTPATIENT
Start: 2023-01-12

## 2023-01-12 RX ORDER — PROPOFOL 10 MG/ML
INJECTION, EMULSION INTRAVENOUS AS NEEDED
Status: DISCONTINUED | OUTPATIENT
Start: 2023-01-12 | End: 2023-01-12

## 2023-01-12 RX ORDER — GLYCOPYRROLATE 0.2 MG/ML
INJECTION INTRAMUSCULAR; INTRAVENOUS AS NEEDED
Status: DISCONTINUED | OUTPATIENT
Start: 2023-01-12 | End: 2023-01-12

## 2023-01-12 RX ORDER — SODIUM CHLORIDE, SODIUM LACTATE, POTASSIUM CHLORIDE, CALCIUM CHLORIDE 600; 310; 30; 20 MG/100ML; MG/100ML; MG/100ML; MG/100ML
125 INJECTION, SOLUTION INTRAVENOUS CONTINUOUS
Status: DISCONTINUED | OUTPATIENT
Start: 2023-01-12 | End: 2023-01-16 | Stop reason: HOSPADM

## 2023-01-12 RX ADMIN — SODIUM CHLORIDE, SODIUM LACTATE, POTASSIUM CHLORIDE, AND CALCIUM CHLORIDE: .6; .31; .03; .02 INJECTION, SOLUTION INTRAVENOUS at 12:10

## 2023-01-12 RX ADMIN — PROPOFOL 50 MG: 10 INJECTION, EMULSION INTRAVENOUS at 12:53

## 2023-01-12 RX ADMIN — PROPOFOL 100 MG: 10 INJECTION, EMULSION INTRAVENOUS at 12:51

## 2023-01-12 RX ADMIN — PROPOFOL 100 MG: 10 INJECTION, EMULSION INTRAVENOUS at 12:48

## 2023-01-12 RX ADMIN — PROPOFOL 150 MG: 10 INJECTION, EMULSION INTRAVENOUS at 12:42

## 2023-01-12 RX ADMIN — GLYCOPYRROLATE 0.1 MCG: 0.2 INJECTION INTRAMUSCULAR; INTRAVENOUS at 12:36

## 2023-01-12 RX ADMIN — PROPOFOL 50 MG: 10 INJECTION, EMULSION INTRAVENOUS at 12:44

## 2023-01-12 NOTE — H&P
History and Physical -  [facility]  Chantell Novoa 41 y o  female MRN: [id number]      HPI: Chantell Novoa is a 41y o  year old female who presents for evaluation of abdominal pain      REVIEW OF SYSTEMS: Per the HPI, and otherwise unremarkable  Historical Information   Past Medical History:   Diagnosis Date   • Anemia    • Disease of thyroid gland    • Fibroids      Past Surgical History:   Procedure Laterality Date   • THYROIDECTOMY       Social History   Social History     Substance and Sexual Activity   Alcohol Use Yes    Comment: socially      Social History     Substance and Sexual Activity   Drug Use Never     Social History     Tobacco Use   Smoking Status Former   • Packs/day: 0 25   • Types: Cigarettes   Smokeless Tobacco Never   Tobacco Comments    just quit      Family History   Problem Relation Age of Onset   • No Known Problems Mother    • No Known Problems Father    • No Known Problems Sister    • No Known Problems Sister    • No Known Problems Sister    • No Known Problems Sister    • Breast cancer additional onset Neg Hx    • Ovarian cancer Neg Hx    • Cervical cancer Neg Hx    • Uterine cancer Neg Hx        Meds/Allergies     (Not in a hospital admission)      No Known Allergies    Objective     Blood pressure 124/73, pulse 90, temperature 97 8 °F (36 6 °C), temperature source Temporal, resp  rate 20, height 5' 3" (1 6 m), weight 127 kg (281 lb 1 4 oz), last menstrual period 10/01/2021, SpO2 97 %, not currently breastfeeding  PHYSICAL EXAM    Gen: NAD  CV: RRR  CHEST: Clear  ABD: soft, NT/ND  EXT: no edema      ASSESSMENT/PLAN:  This is a 41y o  year old female here for colonoscopy, and she is stable and optimized for her procedure

## 2023-01-12 NOTE — ANESTHESIA PREPROCEDURE EVALUATION
Procedure:  COLONOSCOPY    Relevant Problems   ENDO   (+) Hypothyroidism      GI/HEPATIC   (+) Acid reflux        Physical Exam    Airway    Mallampati score: I  TM Distance: >3 FB  Neck ROM: full     Dental   No notable dental hx     Cardiovascular      Pulmonary      Other Findings        Anesthesia Plan  ASA Score- 3     Anesthesia Type- IV sedation with anesthesia with ASA Monitors  Additional Monitors:   Airway Plan:           Plan Factors-Exercise tolerance (METS): >4 METS  Chart reviewed  Existing labs reviewed  Patient summary reviewed  Patient is not a current smoker  There is medical exclusion for perioperative obstructive sleep apnea risk education  Induction- intravenous  Postoperative Plan-     Informed Consent- Anesthetic plan and risks discussed with patient  I personally reviewed this patient with the CRNA  Discussed and agreed on the Anesthesia Plan with the CRNA  Nat Moore

## 2023-01-12 NOTE — ANESTHESIA POSTPROCEDURE EVALUATION
Post-Op Assessment Note    CV Status:  Stable  Pain Score: 0    Pain management: adequate     Mental Status:  Sleepy   Hydration Status:  Stable   PONV Controlled:  Controlled      Post Op Vitals Reviewed: Yes      Staff: Anesthesiologist, CRNA         No notable events documented      BP      Temp      Pulse     Resp      SpO2

## 2023-01-16 ENCOUNTER — TELEPHONE (OUTPATIENT)
Dept: GASTROENTEROLOGY | Facility: CLINIC | Age: 38
End: 2023-01-16

## 2023-01-16 NOTE — TELEPHONE ENCOUNTER
----- Message from Millicent Kayser sent at 1/15/2023 11:48 AM EST -----  Regarding: Next steps  Contact: [phone]  Good morning  Colon was checked and everything was ok  What can I do next? I cannot live another year is this pain  Something has to be done soon

## 2023-01-16 NOTE — TELEPHONE ENCOUNTER
Janette Leroy PA-C  You 2 minutes ago (8:25 AM)    She needs to f/u with gyn  Braydon Fajardo do not have an explanation for her pain but she has a history of endometriosis and adhesions so this is a likely source

## 2023-05-16 ENCOUNTER — CONSULT (OUTPATIENT)
Dept: BARIATRICS | Facility: CLINIC | Age: 38
End: 2023-05-16

## 2023-05-16 VITALS
RESPIRATION RATE: 18 BRPM | BODY MASS INDEX: 48.05 KG/M2 | OXYGEN SATURATION: 97 % | DIASTOLIC BLOOD PRESSURE: 92 MMHG | HEIGHT: 65 IN | SYSTOLIC BLOOD PRESSURE: 136 MMHG | WEIGHT: 288.4 LBS | HEART RATE: 78 BPM

## 2023-05-16 DIAGNOSIS — Z13.1 SCREENING FOR DIABETES MELLITUS: ICD-10-CM

## 2023-05-16 DIAGNOSIS — E55.9 VITAMIN D DEFICIENCY: ICD-10-CM

## 2023-05-16 DIAGNOSIS — K21.9 ACID REFLUX: ICD-10-CM

## 2023-05-16 DIAGNOSIS — E78.2 MIXED HYPERLIPIDEMIA: ICD-10-CM

## 2023-05-16 DIAGNOSIS — E03.9 HYPOTHYROIDISM: ICD-10-CM

## 2023-05-16 DIAGNOSIS — G89.29 CHRONIC ABDOMINAL PAIN: ICD-10-CM

## 2023-05-16 DIAGNOSIS — E66.01 MORBID OBESITY WITH BMI OF 45.0-49.9, ADULT (HCC): Primary | ICD-10-CM

## 2023-05-16 DIAGNOSIS — R10.9 CHRONIC ABDOMINAL PAIN: ICD-10-CM

## 2023-05-16 DIAGNOSIS — E04.1 NODULE OF LEFT LOBE OF THYROID GLAND: ICD-10-CM

## 2023-05-16 RX ORDER — FLUTICASONE PROPIONATE 50 MCG
2 SPRAY, SUSPENSION (ML) NASAL DAILY
COMMUNITY
Start: 2023-04-25

## 2023-05-16 RX ORDER — DEXTROMETHORPHAN HYDROBROMIDE AND PROMETHAZINE HYDROCHLORIDE 15; 6.25 MG/5ML; MG/5ML
5 SYRUP ORAL 4 TIMES DAILY PRN
COMMUNITY
Start: 2023-04-25

## 2023-05-16 RX ORDER — LORATADINE 10 MG/1
10 TABLET ORAL DAILY
COMMUNITY
Start: 2023-04-25 | End: 2024-04-24

## 2023-05-16 NOTE — PROGRESS NOTES
Assessment/Plan:  Arvind Cespedes was seen today for consult  Diagnoses and all orders for this visit:    Morbid obesity with BMI of 45 0-49 9, adult ([facility] [address] )  -     Ambulatory Referral to Weight Management  -     Insulin, fasting; Future  -     HEMOGLOBIN A1C W/ EAG ESTIMATION; Future  -     Comprehensive metabolic panel; Future  1500kcal diet, menu given  No AOM , needs blood work, needs to schedule CT neck to define mass in neck  Needs better food planning     Hypothyroidism  -     Comprehensive metabolic panel; Future   Had previous surgery for goiter and part of thyroid was removed  Acid reflux  -     Comprehensive metabolic panel; Future  Cont PPI or Pepcid she takes as needed  Nodule of left lobe of thyroid gland  -     Comprehensive metabolic panel; Future  -     TSH w/Reflex; Future  CT needed, not clear if nodule or adjacent mass, advised patient to schedule ASAP    Chronic abdominal pain  -     Comprehensive metabolic panel; Future    Screening for diabetes mellitus  -     HEMOGLOBIN A1C W/ EAG ESTIMATION; Future    Vitamin D deficiency  -     Vitamin D 25 hydroxy; Future    Mixed hyperlipidemia  -     Lipid Panel with Direct LDL reflex; Future       3 4 x 1 7 x 2 4 cm heterogeneous lobulated mass superolateral and adjacent to   the left thyroid lobe, and inferior to the submandibular gland  It is unclear if   this represents an exophytic thyroid nodule versus other mass such as   lymphadenopathy or neoplasm  CT scan with contrast is recommended for better   evaluation  Heterogeneous thyroid  3 4 x 1 7 x 2 4 cm heterogeneous lobulated mass superolateral and adjacent to   the left thyroid lobe, and inferior to the submandibular gland  It is unclear if   this represents an exophytic thyroid nodule versus other mass such as   lymphadenopathy or neoplasm  CT scan with contrast is recommended for better   evaluation  Heterogeneous thyroid       Obesity:   Weight not at goal and patient tried more than 6 months to lose weight and was not able to achieve a meaningful weight loss above 5%  - Discussed options of [facility]-Intensive Lifestyle Intervention Program, Conservative Program, Jorje-En-Y Gastric Bypass and Vertical Sleeve Gastrectomy and the role of weight loss medications  - Patient is interested in pursuing Conservative Program, declines right away bariatric surgery  - Initial weight loss goal of 5-10% weight loss for improved health  - Weight loss can improve patient's co-morbid conditions and/or prevent weight-related complications  Meet dietician: declines  Meet behavioral specialist: declines  • Calorie goal handouts provided: 1500kcal   • Motivational interview performed and patient noted changes to work on until next visit  • Hydration: 64oz fluid, no sugary drinks  • Goal 3 meals per day  • Food log encouraged , phone gege or paper journal  • Increase physical activity by 10 minutes daily  Return in 3mo    Subjective:   Chief Complaint   Patient presents with   • Consult     Initial Consultation with Luis Pierre  SB =  /8       Patient ID: Queenie Vizcaino  is a 41 y o  female with excess weight/obesity here to pursue weight management  Previous notes and records have been reviewed  HPI  Wt Readings from Last 20 Encounters:   05/16/23 131 kg (288 lb 6 4 oz)   04/09/23 132 kg (290 lb)   01/12/23 127 kg (281 lb 1 4 oz)   11/08/22 124 kg (274 lb)   09/27/22 122 kg (270 lb)   07/12/21 132 kg (292 lb)   05/13/21 129 kg (285 lb)   05/06/21 129 kg (285 lb)   03/29/21 123 kg (271 lb 2 7 oz)   12/15/20 123 kg (270 lb 12 8 oz)   09/24/20 126 kg (276 lb 14 4 oz)   09/17/20 126 kg (278 lb 12 8 oz)   08/31/20 132 kg (290 lb)     Obesity/Excess Weight:severe  Severity: Body mass index is 47 99 kg/m²    Onset:  childood  Modifiers: intermittent fasting and 30 min bike exercise   Contributing factors: having one meal per day , not emotional eater  Associated symptoms: comorbid conditions and "infertility issues  Goal is to collect eggs for fertility in the future , needs to lose weight for it  Works nights 9:30pm-6 am  One meal per day [employer]   Snacks: sometimes fruits, night or day sporadic   Hydration:16-32 oz water, juice or soda rare, coffee  Alcohol: once a mo when going out with friends  Smoking:no stopped nicotine 3 years ago , stopped marijuana 1 year ago   Had medical card for pain in pelvic area  Exercise:  Walking at work   [address] guard in school  Sleep: gest few hours of sleep 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   Gets 3 hrs   STOP ban/8    Past Medical History:   Diagnosis Date   • Anemia    • Disease of thyroid gland    • Fibroids      Past Surgical History:   Procedure Laterality Date   • THYROIDECTOMY       The following portions of the patient's history were reviewed and updated as appropriate: allergies, current medications, past family history, past medical history, past social history, past surgical history, and problem list     ROS:  Review of Systems   Constitutional: Negative for activity change  Fatigue  HENT: Negative for trouble swallowing  Respiratory: Negative for shortness of breath  Cardiovascular: Negative for chest pain, edema  Gastrointestinal: Negative for abdominal pain, nausea and vomiting, acid reflux, constipation/diarrhea  Endocrine: negative for heat /cold intolerance  Genitourinary: Negative for difficulty urinating  Musculoskeletal: Negative for gait problem and myalgias  Psychiatric/Behavioral: Negative for behavioral problems including anxiety /depression  Objective:  /92 (BP Location: Right arm, Patient Position: Sitting, Cuff Size: Adult)   Pulse 78   Resp 18   Ht 5' 5\" (1 651 m)   Wt 131 kg (288 lb 6 4 oz)   SpO2 97%   BMI 47 99 kg/m²   Constitutional: Well-developed, well-nourished and Obese Body mass index is 47 99 kg/m²  Mu Nose Alert, cooperative  HEENT: No conjunctival injection    " Pulmonary: No increased work of breathing or signs of respiratory distress  Clear respiratory sounds  CV: Well-perfused, Regular rate and rhythm   Vascular: no peripheral edema  GI: Abdomen obese, Non-distended  MSK: no sarcopenia noted   Neuro: Oriented to person, place and time  Normal Speech  Normal gait  Psych: Flat affect and mood  Normal thought process, no delusions     Labs and Imaging  Recent labs and imaging have been personally reviewed    Lab Results   Component Value Date    WBC 6 83 04/09/2023    HGB 13 5 04/09/2023    HCT 43 2 04/09/2023    MCV 84 04/09/2023     04/09/2023     Lab Results   Component Value Date    SODIUM 138 04/09/2023    K 3 3 (L) 04/09/2023     04/09/2023    CO2 28 04/09/2023    AGAP 6 04/09/2023    BUN 9 04/09/2023    CREATININE 0 95 04/09/2023    GLUC 122 04/09/2023    CALCIUM 7 7 (L) 04/09/2023    AST 17 04/09/2023    ALT 26 04/09/2023    ALKPHOS 68 04/09/2023    TP 7 6 04/09/2023    TBILI 0 23 04/09/2023    EGFR 77 04/09/2023     No results found for: HGBA1C  Lab Results   Component Value Date    AHY9XWNLMFMH 2 876 08/31/2020

## 2023-05-18 ENCOUNTER — OFFICE VISIT (OUTPATIENT)
Dept: BARIATRICS | Facility: CLINIC | Age: 38
End: 2023-05-18

## 2023-05-18 DIAGNOSIS — R63.5 ABNORMAL WEIGHT GAIN: ICD-10-CM

## 2023-05-18 NOTE — PROGRESS NOTES
Weight Management Medical Nutrition Assessment  Zheng Torres was here today for meal planning  She has a histroy of fibroid cysts and endometriosis and told me she was in pain because of it  She is being [facility] OBGYN at [facility]  She works night shift as a  and is taking college classes druing the day  Her eating is not on a rgular schedule  She tends to graze or just grab somtehitng quick to eat  Reivewe her calorie carb and protein needs  Provided sample meal plans, snack list, and protein list     Patient seen by Medical Provider in past 6 months:  yes  Requested to schedule appointment with Medical Provider: No      Anthropometric Measurements  Start Weight (#):288 4 lbs  Current Weight (#): 294 0  TBW % Change from start weight:   Ideal Body Weight (#):125  Goal Weight (#): does have a gaol  Highest:today's wt  Lowest: 240 in college    Weight Loss History  Previous weight loss attempts: High Protein/Low CHO diets (Atkins, Union, etc )  Fasting, vegan    Food and Nutrition Related History  Wake up: 4 pm  Bed Time: ?  varies    Food Recall  Breakfast: leftovers or skips  Snack:  Lunch: skips or will have a bagel  Snack:grazes in bwtween  Dinner:[employer]  Snack:      Beverages: water, regular soda and coffee/tea  Volume of beverage intake: 32 oz    Weekends: Same  Cravings: varies  Trouble area of day:    Frequency of Eating out: every 3 days  Food restrictions:none  Cooking: self   Food Shopping: self    Physical Activity Intake  Activity:Biking  Frequency:infrequently  Physical limitations/barriers to exercise: none    Estimated Needs  Energy  SECA: [id number]   X 1 3 -1000 = [address] Energy Needs:  BMR : 1999   1-2# loss weekly sedentary:  1399 - 1899            1-2# loss weekly lightly active: 1749 - 2249  Maintenance calories for sedentary activity level: 2399  Protein:68 - 85   (1 2-1 5g/kg IBW)  Fluid: 66     (35mL/kg IBW)    Nutrition Diagnosis  Yes; Overweight/obesity  related to Excess energy intake as evidenced by  BMI more than normative standard for age and sex (obesity-grade III 36+)       Nutrition Intervention    Nutrition Prescription  Calories:1500  Protein:68 - 80  Fluid:66    Nutrition Education:    Calorie controlled menu  Lean protein food choices  Healthy snack options  Food journaling tips      Nutrition Counseling:  Strategies: meal planning, portion sizes, healthy snack choices, hydration, fiber intake, protein intake, exercise, food journal      Monitoring and Evaluation:  Evaluation criteria:  Energy Intake  Meet protein needs  Maintain adequate hydration  Monitor weekly weight  Meal planning/preparation  Food journal   Decreased portions at mealtimes and snacks  Physical activity     Barriers to learning:none  Readiness to change: Action:  (Changing behavior)  Comprehension: fair  Expected Compliance: fair

## 2023-05-30 ENCOUNTER — OFFICE VISIT (OUTPATIENT)
Dept: OBGYN CLINIC | Facility: MEDICAL CENTER | Age: 38
End: 2023-05-30

## 2023-05-30 VITALS — SYSTOLIC BLOOD PRESSURE: 114 MMHG | WEIGHT: 293 LBS | BODY MASS INDEX: 49.02 KG/M2 | DIASTOLIC BLOOD PRESSURE: 88 MMHG

## 2023-05-30 DIAGNOSIS — D25.9 UTERINE LEIOMYOMA, UNSPECIFIED LOCATION: ICD-10-CM

## 2023-05-30 DIAGNOSIS — Z79.1 LONG TERM CURRENT USE OF NON-STEROIDAL ANTI-INFLAMMATORIES (NSAID): ICD-10-CM

## 2023-05-30 DIAGNOSIS — N80.9 ENDOMETRIOSIS: Primary | ICD-10-CM

## 2023-05-30 RX ORDER — OMEPRAZOLE 20 MG/1
20 CAPSULE, DELAYED RELEASE ORAL DAILY
Qty: 30 CAPSULE | Refills: 0 | Status: SHIPPED | OUTPATIENT
Start: 2023-05-30

## 2023-05-30 RX ORDER — CELECOXIB 200 MG/1
CAPSULE ORAL
Qty: 30 CAPSULE | Refills: 0 | Status: SHIPPED | OUTPATIENT
Start: 2023-05-30

## 2023-05-30 NOTE — PROGRESS NOTES
Lennox Prosper  1985    S:  41 y o   female with c/o pain due to endometriosis  She reports chronic back and pelvic pain over the past 5 years  Had laparoscopy with fulguration at [facility] 10/26/2020 which showed endometriosis  She reports about 2 weeks of pain relief following this  She was subsequently treated with OCP and ibuprofen but found no relief  She had an US 2021 which showed three fibroids measuring up to 3 cm  Subsequently started on Orilissa in 2021  She has been on this since but continues with pain on a daily basis  She does not have any vaginal bleeding since starting the Orilissa  She reports to stopping this medication for about a month and a half a few months ago but restarted it when pain increased  Pain is primarily felt in her right lower back and side  At its best pain will occur once per day, but over the past few months has been having 3 or more episodes per day  It can be aching to sharp  Does not radiate  Rates pain at a scale of 5/10 to 15 out of 10  She denies infection sx of discharge, itching, odor, or UTI sx  She is sexually active with a single male partner  Reports pain of varying severity during IC  Denies dryness or bleeding  Declines STD testing  She continues to take ibuprofen 800 mg TID  Finds little change with this  [facility]  Given tramadol for pain control, which she has been taking intermittently when pain requires it  States she just wants to get rid of everything so she doesn't have anymore pain  She is unsure if she desires children noting she's 41 but doesn't have any yet  She is currently finishing her doctorate in psychology  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 [employer]  Review of Systems   Constitutional: Negative   Gastrointestinal: Negative  Genitourinary: + back and pelvic pain   Negative for discharge, itching, odor, vaginal bleeding, urinary urgency, frequency, hematuria       O:  BP 114/88 (BP Location: Left arm, Patient Position: Sitting, Cuff Size: Large)   Wt 134 kg (294 lb 9 6 oz)   LMP  (LMP Unknown)   BMI 49 02 kg/m²   She appears well and is in no distress  Normocephalic, atraumatic  Normal respiratory effort  Abdomen is soft and nontender  External genitals are normal without lesions or rashes  Vagina is without discharge or bleeding  Cervix is without lesions or discharge  No CMT  Uterus is nontender, no masses  Adnexa are nontender, no pelvic masses appreciated  No focal neurological deficits  Normal mood, affect, and behavior  A/P:  Diagnoses and all orders for this visit:    Endometriosis  -     celecoxib (CeleBREX) 200 mg capsule; Take one tablet by mouth twice daily as needed for pain  Uterine leiomyoma, unspecified location  -     US pelvis complete w transvaginal; Future    Long term current use of non-steroidal anti-inflammatories (NSAID)  -     omeprazole (PriLOSEC) 20 mg delayed release capsule; Take 1 capsule (20 mg total) by mouth daily      Can evaluate pelvic US  Rx for Celebrex sent  Given long term NSAID use, recommended Prilosec when taking  Advised against ibuprofen or other NSAID use with this  She is currently following with a GYN at [facility] but was unable to get a timely appt for pain control, hence returning to this office  Made aware that CS, whom she has previously seen for this, will be leaving practice in the next month  She was given option to RTO with a physician to discuss tx options but encouraged to consider f/u with a specialist to discuss options  Agreeable  All questions answered  Precautions given

## 2023-07-13 DIAGNOSIS — R10.31 RIGHT LOWER QUADRANT ABDOMINAL PAIN: ICD-10-CM

## 2023-07-13 RX ORDER — AMITRIPTYLINE HYDROCHLORIDE 50 MG/1
25 TABLET, FILM COATED ORAL
Qty: 90 TABLET | Refills: 0 | Status: SHIPPED | OUTPATIENT
Start: 2023-07-13

## 2023-07-13 NOTE — TELEPHONE ENCOUNTER
Received Rx refill request   Pharmacy: [facility]  Medication: Amitriptyline 50 mg    Requesting 90 day supply

## 2023-07-20 ENCOUNTER — TELEPHONE (OUTPATIENT)
Dept: BARIATRICS | Facility: CLINIC | Age: 38
End: 2023-07-20

## 2024-02-07 DIAGNOSIS — R10.31 RIGHT LOWER QUADRANT ABDOMINAL PAIN: ICD-10-CM

## 2024-02-07 RX ORDER — AMITRIPTYLINE HYDROCHLORIDE 50 MG/1
25 TABLET, FILM COATED ORAL
Qty: 45 TABLET | Refills: 1 | Status: SHIPPED | OUTPATIENT
Start: 2024-02-07